# Patient Record
Sex: MALE | Race: WHITE | NOT HISPANIC OR LATINO | Employment: FULL TIME | URBAN - METROPOLITAN AREA
[De-identification: names, ages, dates, MRNs, and addresses within clinical notes are randomized per-mention and may not be internally consistent; named-entity substitution may affect disease eponyms.]

---

## 2017-03-02 ENCOUNTER — ALLSCRIPTS OFFICE VISIT (OUTPATIENT)
Dept: OTHER | Facility: OTHER | Age: 62
End: 2017-03-02

## 2017-03-02 ENCOUNTER — APPOINTMENT (OUTPATIENT)
Dept: LAB | Facility: CLINIC | Age: 62
End: 2017-03-02
Payer: COMMERCIAL

## 2017-03-02 DIAGNOSIS — I10 ESSENTIAL (PRIMARY) HYPERTENSION: ICD-10-CM

## 2017-03-02 DIAGNOSIS — Z13.6 ENCOUNTER FOR SCREENING FOR CARDIOVASCULAR DISORDERS: ICD-10-CM

## 2017-03-02 LAB
ALBUMIN SERPL BCP-MCNC: 4.1 G/DL (ref 3.5–5)
ALP SERPL-CCNC: 88 U/L (ref 46–116)
ALT SERPL W P-5'-P-CCNC: 27 U/L (ref 12–78)
ANION GAP SERPL CALCULATED.3IONS-SCNC: 6 MMOL/L (ref 4–13)
AST SERPL W P-5'-P-CCNC: 20 U/L (ref 5–45)
BASOPHILS # BLD AUTO: 0.05 THOUSANDS/ΜL (ref 0–0.1)
BASOPHILS NFR BLD AUTO: 1 % (ref 0–1)
BILIRUB SERPL-MCNC: 0.64 MG/DL (ref 0.2–1)
BUN SERPL-MCNC: 16 MG/DL (ref 5–25)
CALCIUM SERPL-MCNC: 9.2 MG/DL (ref 8.3–10.1)
CHLORIDE SERPL-SCNC: 103 MMOL/L (ref 100–108)
CHOLEST SERPL-MCNC: 200 MG/DL (ref 50–200)
CO2 SERPL-SCNC: 29 MMOL/L (ref 21–32)
CREAT SERPL-MCNC: 0.9 MG/DL (ref 0.6–1.3)
EOSINOPHIL # BLD AUTO: 0.24 THOUSAND/ΜL (ref 0–0.61)
EOSINOPHIL NFR BLD AUTO: 3 % (ref 0–6)
ERYTHROCYTE [DISTWIDTH] IN BLOOD BY AUTOMATED COUNT: 15 % (ref 11.6–15.1)
GFR SERPL CREATININE-BSD FRML MDRD: >60 ML/MIN/1.73SQ M
GLUCOSE SERPL-MCNC: 80 MG/DL (ref 65–140)
HCT VFR BLD AUTO: 49.1 % (ref 36.5–49.3)
HDLC SERPL-MCNC: 110 MG/DL (ref 40–60)
HGB BLD-MCNC: 17.3 G/DL (ref 12–17)
LDLC SERPL CALC-MCNC: 82 MG/DL (ref 0–100)
LYMPHOCYTES # BLD AUTO: 2.8 THOUSANDS/ΜL (ref 0.6–4.47)
LYMPHOCYTES NFR BLD AUTO: 29 % (ref 14–44)
MCH RBC QN AUTO: 32.3 PG (ref 26.8–34.3)
MCHC RBC AUTO-ENTMCNC: 35.2 G/DL (ref 31.4–37.4)
MCV RBC AUTO: 92 FL (ref 82–98)
MONOCYTES # BLD AUTO: 0.83 THOUSAND/ΜL (ref 0.17–1.22)
MONOCYTES NFR BLD AUTO: 9 % (ref 4–12)
NEUTROPHILS # BLD AUTO: 5.77 THOUSANDS/ΜL (ref 1.85–7.62)
NEUTS SEG NFR BLD AUTO: 58 % (ref 43–75)
NRBC BLD AUTO-RTO: 0 /100 WBCS
PLATELET # BLD AUTO: 284 THOUSANDS/UL (ref 149–390)
PMV BLD AUTO: 9.9 FL (ref 8.9–12.7)
POTASSIUM SERPL-SCNC: 4.1 MMOL/L (ref 3.5–5.3)
PROT SERPL-MCNC: 8.2 G/DL (ref 6.4–8.2)
RBC # BLD AUTO: 5.36 MILLION/UL (ref 3.88–5.62)
SODIUM SERPL-SCNC: 138 MMOL/L (ref 136–145)
TRIGL SERPL-MCNC: 40 MG/DL
TSH SERPL DL<=0.05 MIU/L-ACNC: 1.06 UIU/ML (ref 0.36–3.74)
WBC # BLD AUTO: 9.72 THOUSAND/UL (ref 4.31–10.16)

## 2017-03-02 PROCEDURE — 80053 COMPREHEN METABOLIC PANEL: CPT

## 2017-03-02 PROCEDURE — 80061 LIPID PANEL: CPT

## 2017-03-02 PROCEDURE — 84443 ASSAY THYROID STIM HORMONE: CPT

## 2017-03-02 PROCEDURE — 36415 COLL VENOUS BLD VENIPUNCTURE: CPT

## 2017-03-02 PROCEDURE — 85025 COMPLETE CBC W/AUTO DIFF WBC: CPT

## 2017-03-22 ENCOUNTER — TRANSCRIBE ORDERS (OUTPATIENT)
Dept: MRI IMAGING | Facility: CLINIC | Age: 62
End: 2017-03-22

## 2017-03-22 DIAGNOSIS — Z13.6 SCREENING FOR ISCHEMIC HEART DISEASE: Primary | ICD-10-CM

## 2017-03-29 ENCOUNTER — HOSPITAL ENCOUNTER (OUTPATIENT)
Dept: ULTRASOUND IMAGING | Facility: CLINIC | Age: 62
Discharge: HOME | End: 2017-03-29
Payer: COMMERCIAL

## 2017-03-29 DIAGNOSIS — Z13.6 SCREENING FOR ISCHEMIC HEART DISEASE: ICD-10-CM

## 2017-03-29 PROCEDURE — 76775 US EXAM ABDO BACK WALL LIM: CPT

## 2017-03-30 ENCOUNTER — GENERIC CONVERSION - ENCOUNTER (OUTPATIENT)
Dept: OTHER | Facility: OTHER | Age: 62
End: 2017-03-30

## 2017-06-19 ENCOUNTER — ALLSCRIPTS OFFICE VISIT (OUTPATIENT)
Dept: OTHER | Facility: OTHER | Age: 62
End: 2017-06-19

## 2017-06-19 DIAGNOSIS — Z12.5 ENCOUNTER FOR SCREENING FOR MALIGNANT NEOPLASM OF PROSTATE: ICD-10-CM

## 2017-08-14 ENCOUNTER — TRANSCRIBE ORDERS (OUTPATIENT)
Dept: ADMINISTRATIVE | Facility: HOSPITAL | Age: 62
End: 2017-08-14

## 2017-09-15 ENCOUNTER — GENERIC CONVERSION - ENCOUNTER (OUTPATIENT)
Dept: OTHER | Facility: OTHER | Age: 62
End: 2017-09-15

## 2017-09-15 ENCOUNTER — APPOINTMENT (OUTPATIENT)
Dept: LAB | Facility: CLINIC | Age: 62
End: 2017-09-15
Payer: COMMERCIAL

## 2017-09-15 DIAGNOSIS — I10 ESSENTIAL (PRIMARY) HYPERTENSION: ICD-10-CM

## 2017-09-15 LAB
ALBUMIN SERPL BCP-MCNC: 3.8 G/DL (ref 3.5–5)
ALP SERPL-CCNC: 77 U/L (ref 46–116)
ALT SERPL W P-5'-P-CCNC: 26 U/L (ref 12–78)
ANION GAP SERPL CALCULATED.3IONS-SCNC: 8 MMOL/L (ref 4–13)
AST SERPL W P-5'-P-CCNC: 19 U/L (ref 5–45)
BASOPHILS # BLD AUTO: 0.05 THOUSANDS/ΜL (ref 0–0.1)
BASOPHILS NFR BLD AUTO: 1 % (ref 0–1)
BILIRUB SERPL-MCNC: 0.7 MG/DL (ref 0.2–1)
BUN SERPL-MCNC: 11 MG/DL (ref 5–25)
CALCIUM SERPL-MCNC: 9.1 MG/DL (ref 8.3–10.1)
CHLORIDE SERPL-SCNC: 94 MMOL/L (ref 100–108)
CHOLEST SERPL-MCNC: 167 MG/DL (ref 50–200)
CO2 SERPL-SCNC: 29 MMOL/L (ref 21–32)
CREAT SERPL-MCNC: 0.86 MG/DL (ref 0.6–1.3)
EOSINOPHIL # BLD AUTO: 0.2 THOUSAND/ΜL (ref 0–0.61)
EOSINOPHIL NFR BLD AUTO: 2 % (ref 0–6)
ERYTHROCYTE [DISTWIDTH] IN BLOOD BY AUTOMATED COUNT: 14.4 % (ref 11.6–15.1)
GFR SERPL CREATININE-BSD FRML MDRD: 92 ML/MIN/1.73SQ M
GLUCOSE P FAST SERPL-MCNC: 78 MG/DL (ref 65–99)
HCT VFR BLD AUTO: 45.5 % (ref 36.5–49.3)
HDLC SERPL-MCNC: 93 MG/DL (ref 40–60)
HGB BLD-MCNC: 16.6 G/DL (ref 12–17)
LDLC SERPL CALC-MCNC: 65 MG/DL (ref 0–100)
LYMPHOCYTES # BLD AUTO: 2.57 THOUSANDS/ΜL (ref 0.6–4.47)
LYMPHOCYTES NFR BLD AUTO: 28 % (ref 14–44)
MCH RBC QN AUTO: 32.9 PG (ref 26.8–34.3)
MCHC RBC AUTO-ENTMCNC: 36.5 G/DL (ref 31.4–37.4)
MCV RBC AUTO: 90 FL (ref 82–98)
MONOCYTES # BLD AUTO: 0.76 THOUSAND/ΜL (ref 0.17–1.22)
MONOCYTES NFR BLD AUTO: 8 % (ref 4–12)
NEUTROPHILS # BLD AUTO: 5.47 THOUSANDS/ΜL (ref 1.85–7.62)
NEUTS SEG NFR BLD AUTO: 61 % (ref 43–75)
NRBC BLD AUTO-RTO: 0 /100 WBCS
PLATELET # BLD AUTO: 276 THOUSANDS/UL (ref 149–390)
PMV BLD AUTO: 9.2 FL (ref 8.9–12.7)
POTASSIUM SERPL-SCNC: 4 MMOL/L (ref 3.5–5.3)
PROT SERPL-MCNC: 7.5 G/DL (ref 6.4–8.2)
RBC # BLD AUTO: 5.04 MILLION/UL (ref 3.88–5.62)
SODIUM SERPL-SCNC: 131 MMOL/L (ref 136–145)
TRIGL SERPL-MCNC: 45 MG/DL
TSH SERPL DL<=0.05 MIU/L-ACNC: 0.94 UIU/ML (ref 0.36–3.74)
WBC # BLD AUTO: 9.08 THOUSAND/UL (ref 4.31–10.16)

## 2017-09-15 PROCEDURE — 85025 COMPLETE CBC W/AUTO DIFF WBC: CPT

## 2017-09-15 PROCEDURE — 80061 LIPID PANEL: CPT

## 2017-09-15 PROCEDURE — 84443 ASSAY THYROID STIM HORMONE: CPT

## 2017-09-15 PROCEDURE — 80053 COMPREHEN METABOLIC PANEL: CPT

## 2017-09-15 PROCEDURE — 36415 COLL VENOUS BLD VENIPUNCTURE: CPT

## 2017-09-26 ENCOUNTER — GENERIC CONVERSION - ENCOUNTER (OUTPATIENT)
Dept: OTHER | Facility: OTHER | Age: 62
End: 2017-09-26

## 2017-09-29 RX ORDER — TAMSULOSIN HYDROCHLORIDE 0.4 MG/1
0.4 CAPSULE ORAL
COMMUNITY
End: 2019-04-19 | Stop reason: SDUPTHER

## 2017-09-29 RX ORDER — FINASTERIDE 5 MG/1
5 TABLET, FILM COATED ORAL DAILY
COMMUNITY
End: 2019-04-19 | Stop reason: SDUPTHER

## 2017-09-29 RX ORDER — LISINOPRIL AND HYDROCHLOROTHIAZIDE 20; 12.5 MG/1; MG/1
1 TABLET ORAL DAILY
COMMUNITY
End: 2018-02-22 | Stop reason: SDUPTHER

## 2017-09-29 RX ORDER — AMLODIPINE BESYLATE 10 MG/1
10 TABLET ORAL DAILY
COMMUNITY
End: 2018-12-15 | Stop reason: SDUPTHER

## 2017-10-04 ENCOUNTER — ANESTHESIA EVENT (OUTPATIENT)
Dept: PERIOP | Facility: HOSPITAL | Age: 62
End: 2017-10-04
Payer: COMMERCIAL

## 2017-10-05 ENCOUNTER — GENERIC CONVERSION - ENCOUNTER (OUTPATIENT)
Dept: OTHER | Facility: OTHER | Age: 62
End: 2017-10-05

## 2017-10-05 ENCOUNTER — ANESTHESIA (OUTPATIENT)
Dept: PERIOP | Facility: HOSPITAL | Age: 62
End: 2017-10-05
Payer: COMMERCIAL

## 2017-10-05 ENCOUNTER — HOSPITAL ENCOUNTER (OUTPATIENT)
Facility: HOSPITAL | Age: 62
Setting detail: OUTPATIENT SURGERY
Discharge: HOME | End: 2017-10-05
Attending: INTERNAL MEDICINE | Admitting: INTERNAL MEDICINE
Payer: COMMERCIAL

## 2017-10-05 VITALS
HEART RATE: 59 BPM | BODY MASS INDEX: 20.67 KG/M2 | RESPIRATION RATE: 15 BRPM | SYSTOLIC BLOOD PRESSURE: 126 MMHG | DIASTOLIC BLOOD PRESSURE: 76 MMHG | HEIGHT: 75 IN | WEIGHT: 166.23 LBS | TEMPERATURE: 97.9 F | OXYGEN SATURATION: 97 %

## 2017-10-05 DIAGNOSIS — Z12.11 ENCOUNTER FOR SCREENING FOR MALIGNANT NEOPLASM OF COLON: ICD-10-CM

## 2017-10-05 DIAGNOSIS — Z86.010 HISTORY OF COLONIC POLYPS: ICD-10-CM

## 2017-10-05 PROCEDURE — 88305 TISSUE EXAM BY PATHOLOGIST: CPT | Performed by: INTERNAL MEDICINE

## 2017-10-05 RX ORDER — PROPOFOL 10 MG/ML
INJECTION, EMULSION INTRAVENOUS AS NEEDED
Status: DISCONTINUED | OUTPATIENT
Start: 2017-10-05 | End: 2017-10-05 | Stop reason: SURG

## 2017-10-05 RX ORDER — SODIUM CHLORIDE, SODIUM LACTATE, POTASSIUM CHLORIDE, CALCIUM CHLORIDE 600; 310; 30; 20 MG/100ML; MG/100ML; MG/100ML; MG/100ML
125 INJECTION, SOLUTION INTRAVENOUS CONTINUOUS
Status: DISCONTINUED | OUTPATIENT
Start: 2017-10-05 | End: 2017-10-05 | Stop reason: HOSPADM

## 2017-10-05 RX ORDER — LIDOCAINE HYDROCHLORIDE 10 MG/ML
INJECTION, SOLUTION INFILTRATION; PERINEURAL AS NEEDED
Status: DISCONTINUED | OUTPATIENT
Start: 2017-10-05 | End: 2017-10-05 | Stop reason: SURG

## 2017-10-05 RX ADMIN — PROPOFOL 50 MG: 10 INJECTION, EMULSION INTRAVENOUS at 12:03

## 2017-10-05 RX ADMIN — PROPOFOL 100 MG: 10 INJECTION, EMULSION INTRAVENOUS at 11:56

## 2017-10-05 RX ADMIN — PROPOFOL 50 MG: 10 INJECTION, EMULSION INTRAVENOUS at 12:00

## 2017-10-05 RX ADMIN — SODIUM CHLORIDE, POTASSIUM CHLORIDE, SODIUM LACTATE AND CALCIUM CHLORIDE 125 ML/HR: 600; 310; 30; 20 INJECTION, SOLUTION INTRAVENOUS at 11:23

## 2017-10-05 RX ADMIN — PROPOFOL 50 MG: 10 INJECTION, EMULSION INTRAVENOUS at 11:57

## 2017-10-05 RX ADMIN — PROPOFOL 50 MG: 10 INJECTION, EMULSION INTRAVENOUS at 12:06

## 2017-10-05 RX ADMIN — LIDOCAINE HYDROCHLORIDE 50 MG: 10 INJECTION, SOLUTION INFILTRATION; PERINEURAL at 11:56

## 2017-10-05 NOTE — DISCHARGE INSTRUCTIONS
Colonoscopy   WHAT YOU NEED TO KNOW:   A colonoscopy is a procedure to examine the inside of your colon (intestine) with a scope  Polyps or tissue growths may have been removed during your colonoscopy  It is normal to feel bloated and to have some abdominal discomfort  You should be passing gas  If you have hemorrhoids or you had polyps removed, you may have a small amount of bleeding  DISCHARGE INSTRUCTIONS:   Seek care immediately if:   · You have a large amount of bright red blood in your bowel movements  · Your abdomen is hard and firm and you have severe pain  · You have sudden trouble breathing  Contact your healthcare provider if:   · You develop a rash or hives  · You have a fever within 24 hours of your procedure  · You have not had a bowel movement for 3 days after your procedure  · You have questions or concerns about your condition or care  Activity:   · Do not lift, strain, or run  for 3 days after your procedure  · Rest after your procedure  You have been given medicine to relax you  Do not  drive or make important decisions until the day after your procedure  Return to your normal activity as directed  · Relieve gas and discomfort from bloating  by lying on your right side with a heating pad on your abdomen  You may need to take short walks to help the gas move out  Eat small meals until bloating is relieved  If you had polyps removed: For 7 days after your procedure:  · Do not  take aspirin  · Do not  go on long car rides  Help prevent constipation:   · Eat a variety of healthy foods  Healthy foods include fruit, vegetables, whole-grain breads, low-fat dairy products, beans, lean meat, and fish  Ask if you need to be on a special diet  Your healthcare provider may recommend that you eat high-fiber foods such as cooked beans  Fiber helps you have regular bowel movements  · Drink liquids as directed    Adults should drink between 9 and 13 eight-ounce cups of liquid every day  Ask what amount is best for you  For most people, good liquids to drink are water, juice, and milk  · Exercise as directed  Talk to your healthcare provider about the best exercise plan for you  Exercise can help prevent constipation, decrease your blood pressure and improve your health  Follow up with your healthcare provider as directed:  Write down your questions so you remember to ask them during your visits  © 2017 [employer] Information is for End User's use only and may not be sold, redistributed or otherwise used for commercial purposes  All illustrations and images included in CareNotes® are the copyrighted property of [employer]  or [employer]  The above information is an  only  It is not intended as medical advice for individual conditions or treatments  Talk to your doctor, nurse or pharmacist before following any medical regimen to see if it is safe and effective for you

## 2017-10-05 NOTE — ANESTHESIA POSTPROCEDURE EVALUATION
Post-Op Assessment Note      CV Status:  Stable    Mental Status:  Alert and awake    Hydration Status:  Stable    PONV Controlled:  None    Airway Patency:  Patent and adequate    Post Op Vitals Reviewed: Yes          Staff: CRNA       Comments: vss sv nonobstructed           /64 (10/05/17 1210)    Temp 97 9 °F (36 6 °C) (10/05/17 1210)    Pulse 67 (10/05/17 1210)   Resp 19 (10/05/17 1210)    SpO2 99 % (10/05/17 1210)

## 2017-10-05 NOTE — OP NOTE
**** GI/ENDOSCOPY REPORT ****     PATIENT NAME: Abdoul Medeiros ------ VISIT ID:  Patient ID: [id number]   YOB: 1955     INTRODUCTION: Colonoscopy - A 60 male patient presents for an outpatient   Colonoscopy at [address] [place] [address]  PREVIOUS COLONOSCOPY: 2002     INDICATIONS: Surveillance  Average-risk screening for colon cancer  CONSENT:  The benefits, risks, and alternatives to the procedure were   discussed and informed consent was obtained from the patient  PREPARATION: EKG, pulse, pulse oximetry and blood pressure were monitored   throughout the procedure  The patient was identified by myself both   verbally and by visual inspection of ID band  Airway Assessment   Classification: Airway class 2 - Visualization of the soft palate, fauces   and uvula  ASA Classification: Class 2 - Patient has mild to moderate   systemic disturbance that may or may not be related to the disorder   requiring surgery  The patient was kept NPO for eight hours prior to the   procedure  The patient received Nulytely in preparation for the procedure  MEDICATIONS: Anesthesia-check records MAC anesthesia  PROCEDURE:  The endoscope was passed without difficulty through the anus   under direct visualization and advanced to the cecum, confirmed by   appendiceal orifice and ileocecal valve  The scope was withdrawn and the   mucosa was carefully examined  The quality of the preparation was good  Cecal Intubation Time: 2 minutes(s) Scope Withdrawal Time: 8 minutes(s)     RECTAL EXAM: Normal rectal exam      FINDINGS:  A single sessile polyp, measuring between 10 and 20 mm in size,   was found in the sigmoid colon  The polyp was completely removed by snare   cautery polypectomy  Retrieved  A single sessile polyp, measuring between   10 and 20 mm in size, was found in the hepatic flexure  The polyp was   completely removed by cold snare polypectomy  The polyp was retrieved   The   cecum, rectum, sigmoid colon, descending colon, and splenic flexure   appeared to be normal      COMPLICATIONS: There were no complications  IMPRESSIONS: A single sessile polyp found in the sigmoid colon; removed by   snare cautery polypectomy  A single sessile polyp found in the hepatic   flexure; removed by cold snare polypectomy  Normal cecum, rectum, sigmoid   colon, descending colon, and splenic flexure  RECOMMENDATIONS: Colonoscopy recommended in 3 years  Follow-up on the   results of the biopsy specimens  ESTIMATED BLOOD LOSS: None  PATHOLOGY SPECIMENS: Completely removed by snare cautery polypectomy  Completely removed by cold snare polypectomy  Yes     PROCEDURE CODES: : Colonoscopy with snare polypectomy     ICD-9 Codes: V76 51 Special screening for malignant neoplasms of colon   211 3 Benign neoplasm of colon 211 3 Benign neoplasm of colon     ICD-10 Codes: Z12 11 Encounter for screening for malignant neoplasm of   colon K63 5 Polyp of colon K63 5 Polyp of colon     PERFORMED BY: SHAI Baldwin  on 10/05/2017  Version 1, electronically signed by SHAI Ha  on   10/05/2017 at 12:10

## 2017-10-05 NOTE — ANESTHESIA PREPROCEDURE EVALUATION
Review of Systems/Medical History  Patient summary reviewed        Cardiovascular  Negative cardio ROS Hypertension ,    Pulmonary  Negative pulmonary ROS Smoker cigarette smoker , Tobacco cessation counseling given, ,        GI/Hepatic  Negative GI/hepatic ROS          Negative  ROS Prostatic disorder, benign prostatic hyperplasia       Endo/Other  Negative endo/other ROS      GYN  Negative gynecology ROS          Hematology  Negative hematology ROS      Musculoskeletal  Negative musculoskeletal ROS        Neurology  Negative neurology ROS      Psychology   Negative psychology ROS            Physical Exam    Airway    Mallampati score: II  TM Distance: >3 FB  Neck ROM: full     Dental       Cardiovascular  Comment: Negative ROS, Cardiovascular exam normal    Pulmonary  Pulmonary exam normal     Other Findings        Anesthesia Plan  ASA Score- 2       Anesthesia Type- IV sedation with anesthesia        Induction- intravenous      Informed Consent  Anesthetic plan and risks discussed with patient

## 2017-10-10 ENCOUNTER — GENERIC CONVERSION - ENCOUNTER (OUTPATIENT)
Dept: OTHER | Facility: OTHER | Age: 62
End: 2017-10-10

## 2018-01-13 VITALS
HEART RATE: 72 BPM | WEIGHT: 174 LBS | BODY MASS INDEX: 21.64 KG/M2 | HEIGHT: 75 IN | SYSTOLIC BLOOD PRESSURE: 150 MMHG | DIASTOLIC BLOOD PRESSURE: 80 MMHG

## 2018-01-13 VITALS
HEIGHT: 75 IN | DIASTOLIC BLOOD PRESSURE: 100 MMHG | WEIGHT: 176 LBS | BODY MASS INDEX: 21.88 KG/M2 | HEART RATE: 64 BPM | SYSTOLIC BLOOD PRESSURE: 160 MMHG

## 2018-01-15 NOTE — RESULT NOTES
Verified Results  (1) TISSUE EXAM [id number] 12:08PM Hola Ice     Test Name Result Flag Reference   LAB AP CASE REPORT (Report)     Surgical Pathology Report             Case: [id number]                   Authorizing Provider: Myrtle Weller MD  Collected:      10/05/2017 1208        Ordering Location:   [facility] Received:      10/05/2017 1342                    Operating Room                                 Pathologist:      Jonelle Arceo MD                             Specimens:  A) - Polyp, Colorectal, hepatic flexure polyp                             B) - Polyp, Colorectal, 35cm polyp   LAB AP FINAL DIAGNOSIS (Report)     A  Colon, hepatic flexure polyp:    - Benign colonic mucosa, negative for dysplasia and malignancy  B  Colon, polyp at 35 cm:    - Tubular adenoma  - Negative for high grade dysplasia and malignancy  Electronically signed by Jonelle Arceo MD on 10/9/2017 at 1:46 PM   LAB AP SURGICAL ADDITIONAL INFORMATION (Report)     All controls performed with the immunohistochemical stains reported above   reacted appropriately  These tests were developed and their performance   characteristics determined by [facility] or   "[employer]  They may not be cleared or approved by the [place]  Food and Drug Administration  The FDA has determined that such clearance   or approval is not necessary  These tests are used for clinical purposes  They should not be regarded as investigational or for research  This   laboratory has been approved by [place] 88, designated as a high-complexity   laboratory and is qualified to perform these tests  LAB AP GROSS DESCRIPTION (Report)     A  The specimen is received in formalin, labeled with the patient's name   and hospital number, and is designated hepatic flexure polyp, is a   single irregularly shaped fragment of tan soft tissue measuring 1 0 cm in   greatest dimension  Entirely submitted   One cassette  B  The specimen is received in formalin, labeled with the patient's name   and hospital number, and is designated 35 cm polyp, is a tan-brown   polypoid structure measuring 1 0 cm in greatest dimension  The resection   margin is inked black  The specimen is bisected revealing tan cut   surfaces  Entirely submitted  One cassette  Note: The estimated total formalin fixation time based upon information   provided by the submitting clinician and the standard processing schedule   is less than 72 hours      RLR   LAB AP CLINICAL INFORMATION Cold snare polyp     Cold snare polyp

## 2018-01-15 NOTE — RESULT NOTES
Verified Results  [address] [id number] 07:48AM Shelby Quiroz     Test Name Result Flag Reference   US ABDOMINAL AORTA (Report)     ABDOMINAL AORTIC ULTRASOUND     INDICATION: Evaluate for aortic aneurysm  COMPARISON: None  FINDINGS:      Ultrasound of the abdominal aorta was performed in longitudinal and transverse planes with a curvilinear transducer  Proximal aorta: 2 1 x 2 1 cm   Mid aorta:  2 1 x 2 0 cm   Distal aorta:  1 7 x 2 0 cm   Right common iliac origin: 1 2 cm   Left common iliac origin: 1 4 cm     No periaortic collections or adenopathy detected  IMPRESSION:     No evidence for abdominal aortic aneurysm         Workstation performed: [id number]     Signed by:   Delia Ribeiro MD   3/30/17

## 2018-01-22 VITALS
HEART RATE: 76 BPM | HEIGHT: 75 IN | SYSTOLIC BLOOD PRESSURE: 128 MMHG | WEIGHT: 173 LBS | BODY MASS INDEX: 21.51 KG/M2 | DIASTOLIC BLOOD PRESSURE: 80 MMHG

## 2018-01-22 VITALS
WEIGHT: 171.25 LBS | DIASTOLIC BLOOD PRESSURE: 84 MMHG | SYSTOLIC BLOOD PRESSURE: 138 MMHG | HEART RATE: 84 BPM | OXYGEN SATURATION: 92 % | BODY MASS INDEX: 21.29 KG/M2 | HEIGHT: 75 IN

## 2018-02-22 DIAGNOSIS — I10 ESSENTIAL HYPERTENSION: Primary | ICD-10-CM

## 2018-02-22 RX ORDER — LISINOPRIL AND HYDROCHLOROTHIAZIDE 20; 12.5 MG/1; MG/1
TABLET ORAL
Qty: 180 TABLET | Refills: 3 | Status: SHIPPED | OUTPATIENT
Start: 2018-02-22 | End: 2019-03-11 | Stop reason: SDUPTHER

## 2018-02-27 ENCOUNTER — TELEPHONE (OUTPATIENT)
Dept: INTERNAL MEDICINE CLINIC | Facility: CLINIC | Age: 63
End: 2018-02-27

## 2018-02-27 NOTE — TELEPHONE ENCOUNTER
Pt cld stating that he needs to have his LISINOPRIL filled  He is totally out  Please send to [facility] in [place]

## 2018-08-10 ENCOUNTER — OFFICE VISIT (OUTPATIENT)
Dept: INTERNAL MEDICINE CLINIC | Facility: CLINIC | Age: 63
End: 2018-08-10
Payer: COMMERCIAL

## 2018-08-10 VITALS
BODY MASS INDEX: 20.91 KG/M2 | HEART RATE: 63 BPM | SYSTOLIC BLOOD PRESSURE: 128 MMHG | OXYGEN SATURATION: 94 % | DIASTOLIC BLOOD PRESSURE: 100 MMHG | HEIGHT: 75 IN | WEIGHT: 168.2 LBS

## 2018-08-10 DIAGNOSIS — M54.12 CERVICAL RADICULOPATHY: Primary | ICD-10-CM

## 2018-08-10 PROBLEM — N40.0 ENLARGED PROSTATE WITHOUT LOWER URINARY TRACT SYMPTOMS (LUTS): Status: ACTIVE | Noted: 2017-03-02

## 2018-08-10 PROBLEM — F17.210 CIGARETTE NICOTINE DEPENDENCE WITHOUT COMPLICATION: Status: ACTIVE | Noted: 2017-03-02

## 2018-08-10 PROBLEM — R35.1 NOCTURIA: Status: ACTIVE | Noted: 2017-06-19

## 2018-08-10 PROBLEM — I95.1 ORTHOSTATIC HYPOTENSION: Status: ACTIVE | Noted: 2017-09-15

## 2018-08-10 PROBLEM — R35.0 INCREASED FREQUENCY OF URINATION: Status: ACTIVE | Noted: 2017-06-19

## 2018-08-10 PROBLEM — J30.9 ALLERGIC RHINITIS: Status: ACTIVE | Noted: 2017-03-02

## 2018-08-10 PROCEDURE — 3008F BODY MASS INDEX DOCD: CPT | Performed by: INTERNAL MEDICINE

## 2018-08-10 PROCEDURE — 99214 OFFICE O/P EST MOD 30 MIN: CPT | Performed by: INTERNAL MEDICINE

## 2018-08-10 RX ORDER — METHOCARBAMOL 500 MG/1
500 TABLET, FILM COATED ORAL 2 TIMES DAILY
Qty: 20 TABLET | Refills: 0 | Status: SHIPPED | OUTPATIENT
Start: 2018-08-10 | End: 2020-03-13

## 2018-08-10 RX ORDER — NAPROXEN 500 MG/1
500 TABLET ORAL 2 TIMES DAILY WITH MEALS
Qty: 20 TABLET | Refills: 0 | Status: SHIPPED | OUTPATIENT
Start: 2018-08-10 | End: 2020-03-13

## 2018-08-10 NOTE — PROGRESS NOTES
INTERNAL MEDICINE FOLLOW-UP OFFICE VISIT  [facility]    NAME: Fabricio Moreno  AGE: 58 y o  SEX: male  : 1955   MRN: [id number]    DATE: 8/10/2018  TIME: 3:13 PM    Assessment and Plan     Diagnoses and all orders for this visit:    Cervical radiculopathy  -     naproxen (NAPROSYN) 500 mg tablet; Take 1 tablet (500 mg total) by mouth 2 (two) times a day with meals  -     methocarbamol (ROBAXIN) 500 mg tablet; Take 1 tablet (500 mg total) by mouth 2 (two) times a day    He was in the ER yesterday with chest pain and had a cardiac workup which was negative  He was told he had cervical radiculopathy  The    - Counseling Documentation: patient was counseled regarding: diagnostic results, instructions for management, risk factor reductions, prognosis, patient and family education, impressions, risks and benefits of treatment options and importance of compliance with treatment  - Medication Side Effects: Adverse side effects of medications were reviewed with the patient/guardian today  Return to office in: as needed the    Chief Complaint     Chief Complaint   Patient presents with    Follow-up     ER Visit - [facility]       History of Present Illness     Neck Pain    This is a new problem  The current episode started in the past 7 days  The problem occurs intermittently  The problem has been gradually improving  The pain is associated with nothing  The quality of the pain is described as aching  The pain is at a severity of 4/10  The pain is mild  Associated symptoms include numbness and tingling  Pertinent negatives include no chest pain, fever, headaches or weakness  He has tried NSAIDs for the symptoms  The treatment provided mild relief         The following portions of the patient's history were reviewed and updated as appropriate: allergies, current medications, past family history, past medical history, past social history, past surgical history and problem list     Review of Systems     Review of Systems   Constitutional: Negative for chills, diaphoresis, fatigue and fever  HENT: Negative for congestion, ear discharge, ear pain, hearing loss, postnasal drip, rhinorrhea, sinus pain, sinus pressure, sneezing, sore throat and voice change  Eyes: Negative for pain, discharge, redness and visual disturbance  Respiratory: Negative for cough, chest tightness, shortness of breath and wheezing  Cardiovascular: Negative for chest pain, palpitations and leg swelling  Gastrointestinal: Negative for abdominal distention, abdominal pain, blood in stool, constipation, diarrhea, nausea and vomiting  Endocrine: Negative for cold intolerance, heat intolerance, polydipsia, polyphagia and polyuria  Genitourinary: Negative for dysuria, flank pain, frequency, hematuria and urgency  Musculoskeletal: Positive for neck pain  Negative for arthralgias, back pain, gait problem, joint swelling, myalgias and neck stiffness  Skin: Negative for rash  Neurological: Positive for tingling and numbness  Negative for dizziness, tremors, syncope, facial asymmetry, speech difficulty, weakness, light-headedness and headaches  Hematological: Does not bruise/bleed easily  Psychiatric/Behavioral: Negative for behavioral problems, confusion and sleep disturbance  The patient is not nervous/anxious  Active Problem List     Patient Active Problem List   Diagnosis    Allergic rhinitis    Cigarette nicotine dependence without complication    Enlarged prostate without lower urinary tract symptoms (luts)    Essential hypertension    Nocturia    Orthostatic hypotension    Increased frequency of urination    Cervical radiculopathy       Objective     /100 (BP Location: Left arm, Patient Position: Sitting)   Pulse 63   Ht 6' 3" (1 905 m)   Wt 76 3 kg (168 lb 3 2 oz)   SpO2 94%   BMI 21 02 kg/m²     Physical Exam   Constitutional: He is oriented to person, place, and time   He appears well-developed and well-nourished  No distress  HENT:   Head: Normocephalic and atraumatic  Right Ear: External ear normal    Left Ear: External ear normal    Nose: Nose normal    Mouth/Throat: Oropharynx is clear and moist    Eyes: Conjunctivae and EOM are normal  Right eye exhibits no discharge  Left eye exhibits no discharge  No scleral icterus  Neck: Normal range of motion  Neck supple  No JVD present  No tracheal deviation present  No thyromegaly present  Cardiovascular: Normal rate, regular rhythm, normal heart sounds and intact distal pulses  Exam reveals no gallop and no friction rub  No murmur heard  Pulmonary/Chest: Effort normal and breath sounds normal  No respiratory distress  He has no wheezes  He has no rales  He exhibits no tenderness  Abdominal: Soft  Bowel sounds are normal  He exhibits no distension  There is no tenderness  There is no rebound and no guarding  Musculoskeletal: Normal range of motion  He exhibits no edema or tenderness  Lymphadenopathy:     He has no cervical adenopathy  Neurological: He is alert and oriented to person, place, and time  No cranial nerve deficit  He exhibits normal muscle tone  Coordination normal    Skin: Skin is warm and dry  No rash noted  He is not diaphoretic  No erythema  Psychiatric: He has a normal mood and affect   Judgment normal          Current Medications       Current Outpatient Prescriptions:     amLODIPine (NORVASC) 10 mg tablet, Take 10 mg by mouth daily, Disp: , Rfl:     finasteride (PROSCAR) 5 mg tablet, Take 5 mg by mouth daily, Disp: , Rfl:     lisinopril-hydrochlorothiazide (PRINZIDE,ZESTORETIC) 20-12 5 MG per tablet, TAKE 2 TABLET DAILY, Disp: 180 tablet, Rfl: 3    tamsulosin (FLOMAX) 0 4 mg, Take 0 4 mg by mouth daily with dinner, Disp: , Rfl:     methocarbamol (ROBAXIN) 500 mg tablet, Take 1 tablet (500 mg total) by mouth 2 (two) times a day, Disp: 20 tablet, Rfl: 0    naproxen (NAPROSYN) 500 mg tablet, Take 1 tablet (500 mg total) by mouth 2 (two) times a day with meals, Disp: 20 tablet, Rfl: 0    Health Maintenance     Health Maintenance   Topic Date Due    HIV SCREENING  1955    Hepatitis C Screening  1955    DTaP,Tdap,and Td Vaccines (1 - Tdap) 03/14/1975    INFLUENZA VACCINE  09/01/2018    Depression Screening PHQ-9  08/10/2019    CRC Screening: Colonoscopy  10/05/2020     Immunization History   Administered Date(s) Administered    Influenza TIV (IM) 1955    Pneumococcal Polysaccharide PPV23 1955    Tdap 1955    Zoster 1955         Eli Mcgill MD  [facility]

## 2018-08-13 ENCOUNTER — NURSE TRIAGE (OUTPATIENT)
Dept: PHYSICAL THERAPY | Facility: OTHER | Age: 63
End: 2018-08-13

## 2018-08-13 ENCOUNTER — TELEPHONE (OUTPATIENT)
Dept: INTERNAL MEDICINE CLINIC | Facility: CLINIC | Age: 63
End: 2018-08-13

## 2018-08-13 DIAGNOSIS — M54.12 CERVICAL RADICULOPATHY: Primary | ICD-10-CM

## 2018-08-13 DIAGNOSIS — M25.512 ACUTE PAIN OF LEFT SHOULDER: ICD-10-CM

## 2018-08-13 DIAGNOSIS — M79.602 PAIN OF LEFT UPPER EXTREMITY: Primary | ICD-10-CM

## 2018-08-13 NOTE — TELEPHONE ENCOUNTER
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  Was not lifting or tossing anything at the time of onset of pain  Pt unable to sleep because of the arm pain  Can sit in the recliner without pain  But when he lies flat, the pain is terrible       Background - Initial Assessment  Clinical complaint: left arm pain "tricept to elbow", some pain in left shoulder  Date of onset: 8/9/18  Mechanism of injury: unk    Previous Treatment - Previous Treatment  Previous evaluation: ED visit 8/9, PCP 8/10  Current provider: *No Answer*  Diagnostics: XRAYs  Previous treatment: *No Answer*    Protocols used: [facility]

## 2018-08-13 NOTE — TELEPHONE ENCOUNTER
Pt was here for an appt on Friday with Dr Stew Dooley for a pinched nerve in his left arm  He has been taking naproxen and methocarbamol but is still in a lot of pain  States Dr Stew Dooley mentioned either physical therapy or an MRI  He would like to have both done if possible   Please advise    [phone]

## 2018-08-13 NOTE — TELEPHONE ENCOUNTER
Answering service 8/12/2018 1:50 pm   Pt is still in pain needs appointment for tomorrow    Next step pt contact with details

## 2018-08-14 ENCOUNTER — EVALUATION (OUTPATIENT)
Dept: PHYSICAL THERAPY | Facility: CLINIC | Age: 63
End: 2018-08-14
Payer: COMMERCIAL

## 2018-08-14 VITALS — HEART RATE: 70 BPM | TEMPERATURE: 100 F | DIASTOLIC BLOOD PRESSURE: 90 MMHG | SYSTOLIC BLOOD PRESSURE: 150 MMHG

## 2018-08-14 DIAGNOSIS — M25.512 ACUTE PAIN OF LEFT SHOULDER: ICD-10-CM

## 2018-08-14 DIAGNOSIS — M79.602 PAIN OF LEFT UPPER EXTREMITY: ICD-10-CM

## 2018-08-14 DIAGNOSIS — M54.12 CERVICAL RADICULOPATHY: Primary | ICD-10-CM

## 2018-08-14 PROCEDURE — G8990 OTHER PT/OT CURRENT STATUS: HCPCS | Performed by: PHYSICAL THERAPIST

## 2018-08-14 PROCEDURE — 97140 MANUAL THERAPY 1/> REGIONS: CPT | Performed by: PHYSICAL THERAPIST

## 2018-08-14 PROCEDURE — G8991 OTHER PT/OT GOAL STATUS: HCPCS | Performed by: PHYSICAL THERAPIST

## 2018-08-14 PROCEDURE — 97012 MECHANICAL TRACTION THERAPY: CPT | Performed by: PHYSICAL THERAPIST

## 2018-08-14 NOTE — PROGRESS NOTES
PT Evaluation     Today's date: 2018  Patient name: Barbara Poster  : 1955  MRN: [id number]  Referring provider: Catherine Bergman MD  Dx:   Encounter Diagnosis     ICD-10-CM    1  Cervical radiculopathy M54 12    2  Pain of left upper extremity M79 602 Ambulatory referral to PT spine   3  Acute pain of left shoulder M25 512 Ambulatory referral to PT spine       Start Time: 1600  Stop Time: 1700  Total time in clinic (min): 60 minutes    Assessment  Impairments: abnormal muscle firing, abnormal or restricted ROM, activity intolerance, impaired physical strength, lacks appropriate home exercise program, pain with function, poor posture  and poor body mechanics    Assessment details: Patient is a 58 y o  male who presents to [place] [address] program with the above listed impairments  Patients fits into a hypomobility and traction treatment category due to probable facet dysfunction and would benefit from skilled PT services to address these impairments and to maximize function  Understanding of Dx/Px/POC: good   Prognosis: good    Plan  Patient would benefit from: skilled physical therapy  Planned modality interventions: traction  Planned therapy interventions: home exercise program, functional ROM exercises, flexibility, body mechanics training, postural training, patient education, therapeutic activities, therapeutic exercise, manual therapy, joint mobilization and neuromuscular re-education  Frequency: 2x week  Duration in weeks: 2  Treatment plan discussed with: patient and PCP  Plan details: PRN f/u at this time  Subjective Evaluation    History of Present Illness  Mechanism of injury: Patient reports on 18 he started to have L shoulder and arm pain  He states he thought he was having a MI so he drove to the ER at [facility]  He did have an x-ray of the c/s taken there  There was not acute findings according to the patient    He states she saw her PCP on 8/10/18 who prescribed naproxen and muscle relaxer  He states this has been helping  He notes before taking this medication he had a hard time turning his head to the L  He notes currently his pain radiates into the triceps and forearm on the L with numbness into the 4th and 5th digits  He notes that he has not been able to sleep the last 4 days  He notes that lifting his arm up or sitting back will help his sxs  Occupation: Supervisor for [employer]   - 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  PLOF: Independent   Pain  Current pain ratin  At best pain ratin  At worst pain ratin  Location: L arm    Patient Goals  Patient goal: To stop the pain  Objective     Special Questions  Negative for bladder dysfunction, bowel dysfunction and saddle (S4) numbness    Additional Special Questions  Unexplained Weight Loss: NO  Fever: NO  Urine Retention: NO  Acute Drop Foot or Paralysis: NO  Major Trauma (fall from height, high speed collision, direct blow to spine): NO              If yes; does patient have nausea, lightheadedness, or loss of consciousness: NA        Tenderness     Additional Tenderness Details  No tenderness noted upon palpation  Neurological Testing     Sensation   Cervical/Thoracic   Left   Intact: light touch    Right   Intact: light touch    Lumbar   Left   Intact: light touch    Right   Intact: light touch    Reflexes   Left   Deltoid (C5): normal (2+)  Biceps (C5/C6): normal (2+)  Triceps (C7): normal (2+)  Patellar (L4): normal (2+)  Achilles (S1): normal (2+)    Right   Deltoid (C5): normal (2+)  Biceps (C5/C6): normal (2+)  Triceps (C7): normal (2+)  Patellar (L4): normal (2+)  Achilles (S1): normal (2+)    Additional Neurological Details  UQS was positive  Spurlings test reproduced radicular sxs down the left  upper extremity and created pheralization  A cervical traction force centralized sxs        Active Range of Motion   Cervical/Thoracic Spine   Cervical    Flexion: 30 degrees   Extension: 60 degrees with pain  Left lateral flexion: 25 degrees with pain  Right lateral flexion: 30 degrees   Left rotation: 55 degrees with pain  Right rotation: 70 degrees     Additional Active Range of Motion Details  -  -    Joint Play Hypomobile: C5, C6, C7 and T1   Comments: -    Strength/Myotome Testing     Additional Strength Details  -    Tests   Cervical   Deep neck flexor endurance: 10 seconds  Positive repeated extension  Negative repeated flexion  Left   Positive cervical distraction and cervical compression test     Negative alar ligament integrity  Right   Negative alar ligament integrity  Left Shoulder   Negative ULTT1, ULTT3 and ULTT4  Right Shoulder   Negative ULTT1, ULTT3 and ULTT4  Additional Tests Details  AA instability - Sharp Lv: negative  General Comments     Cervical/Thoracic Comments  No myotomal weakness detected  4/5 strength noted grossly at B scapular stabilizers  Pt instructed to continue to monitor temperature and f/u with PCP if high temp is noted  CTJ, c/s lateral glides, and c/s mechanical traction performed  L c/s rotation increased to 65 degrees and sxs were abolished after today's treatment  Pt educated on posture in regarding to sitting, standing, and with sleeping  Pt is to call back on Friday to f/u with me  He is welcome to return sooner if needed        Flowsheet Rows      Most Recent Value   PT/OT G-Codes   Current Score  69   Projected Score  83   FOTO information reviewed  Yes   Assessment Type  Evaluation   G code set  Other PT/OT Primary   Other PT Primary Current Status ()  CJ   Other PT Primary Goal Status ()  CH          Precautions: HTN    Daily Treatment Diary   Diagnosis: c/s radic   Precautions: NA   Manuals 8/14       C/s lateral glides CMF       CTJ grade V CMF                       Exercise Diary        Mechanical tx 20#, 5'x2                                            Pt edu cmf           Modalities             CP PRN

## 2018-08-15 ENCOUNTER — OFFICE VISIT (OUTPATIENT)
Dept: PHYSICAL THERAPY | Facility: CLINIC | Age: 63
End: 2018-08-15
Payer: COMMERCIAL

## 2018-08-15 DIAGNOSIS — M54.12 CERVICAL RADICULOPATHY: Primary | ICD-10-CM

## 2018-08-15 DIAGNOSIS — M79.602 PAIN OF LEFT UPPER EXTREMITY: ICD-10-CM

## 2018-08-15 DIAGNOSIS — M25.512 ACUTE PAIN OF LEFT SHOULDER: ICD-10-CM

## 2018-08-15 PROCEDURE — 97110 THERAPEUTIC EXERCISES: CPT | Performed by: PHYSICAL THERAPIST

## 2018-08-15 PROCEDURE — 97012 MECHANICAL TRACTION THERAPY: CPT | Performed by: PHYSICAL THERAPIST

## 2018-08-15 PROCEDURE — 97140 MANUAL THERAPY 1/> REGIONS: CPT | Performed by: PHYSICAL THERAPIST

## 2018-08-15 NOTE — PROGRESS NOTES
Daily Note     Today's date: 2018  Patient name: Abhay Casper  : 1955  MRN: [id number]  Referring provider: Virgil Aleman MD  Dx:   Encounter Diagnosis     ICD-10-CM    1  Cervical radiculopathy M54 12    2  Pain of left upper extremity M79 602    3  Acute pain of left shoulder M25 512        Start Time: 1400  Stop Time: 1450  Total time in clinic (min): 50 minutes    Subjective: Pt called back this morning and stats that his pain returned  He reports his pain is less intense  He notes he was able to sleep easier last night  He no longer notes pain in the triceps but does note pain at the L foremen and continued tingling at the 4th and 5th digits  Objective: See treatment diary below    Diagnosis: c/s radic   Precautions: NA   Manuals 8/14 8/15      C/s lateral glides CMF CMF      CTJ grade V CMF CMF                      Exercise Diary        Mechanical tx 20#, 5'x2 25#  5'x3                              DNF  :05x15      Ulnar nerve glies  :05x10      B ER against wall  Yellow x15                                                                                                         Pt edu cmf  CMF         Modalities             CP PRN                                          Assessment: I was unable to decrease sxs today with manual technique of the c/s  Minimal decrease of sxs noted with mechanical tx  Median and radial nerve TT was negative  Ulnar was positive  Pt given ulanr nerve glides which did eliminate pt's sxs  Pt was given updated HEP with good understanding  Plan: Pt to f/u as needed  We will touch base tomorrow via phone call  If sxs do not progress we will consider ordering imaging

## 2018-09-26 NOTE — PROGRESS NOTES
PT Discharge    Today's date: 2018  Patient name: Isha Menjivar  : 1955  MRN: [id number]  Referring provider: Kandis Coy MD  Dx:   Encounter Diagnosis     ICD-10-CM    1  Cervical radiculopathy M54 12    2  Pain of left upper extremity M79 602    3  Acute pain of left shoulder M25 512        Start Time: 1400  Stop Time: 1450  Total time in clinic (min): 50 minutes    Assessment/Plan    Subjective    Objective    Flowsheet Rows      Most Recent Value   PT/OT G-Codes   Current Score  54   Projected Score  73          Pt never returned to PT but did leave a message a few days after his last appt  That he was feeling much better and no long had pain  Multi attempts were made to reach to patient but I was unable to reach him via phone call  D/C from PT at this time

## 2018-12-15 DIAGNOSIS — I10 ESSENTIAL HYPERTENSION: Primary | ICD-10-CM

## 2018-12-17 RX ORDER — AMLODIPINE BESYLATE 10 MG/1
TABLET ORAL
Qty: 90 TABLET | Refills: 3 | Status: SHIPPED | OUTPATIENT
Start: 2018-12-17 | End: 2019-12-14 | Stop reason: SDUPTHER

## 2019-03-11 DIAGNOSIS — I10 ESSENTIAL HYPERTENSION: ICD-10-CM

## 2019-03-11 RX ORDER — LISINOPRIL AND HYDROCHLOROTHIAZIDE 20; 12.5 MG/1; MG/1
2 TABLET ORAL DAILY
Qty: 180 TABLET | Refills: 3 | Status: SHIPPED | OUTPATIENT
Start: 2019-03-11 | End: 2020-03-09 | Stop reason: SDUPTHER

## 2019-03-11 NOTE — TELEPHONE ENCOUNTER
Pt called requesting a refill on his lisinopril-hydrochlorothiazide(prinzide,zestoretic) 20-12 5 mg per tablet   Sent into [facility] pharmacy      Patient stated that its hard to come in and be seen to get it filled because he has no personal days until April     Any questions please contact Pepe Phipps at  [phone]

## 2019-04-19 ENCOUNTER — OFFICE VISIT (OUTPATIENT)
Dept: UROLOGY | Facility: CLINIC | Age: 64
End: 2019-04-19
Payer: COMMERCIAL

## 2019-04-19 VITALS
HEART RATE: 60 BPM | SYSTOLIC BLOOD PRESSURE: 140 MMHG | DIASTOLIC BLOOD PRESSURE: 88 MMHG | BODY MASS INDEX: 21.06 KG/M2 | HEIGHT: 75 IN | WEIGHT: 169.4 LBS

## 2019-04-19 DIAGNOSIS — R35.0 BENIGN PROSTATIC HYPERPLASIA WITH URINARY FREQUENCY: Primary | ICD-10-CM

## 2019-04-19 DIAGNOSIS — N40.1 BENIGN PROSTATIC HYPERPLASIA WITH URINARY FREQUENCY: Primary | ICD-10-CM

## 2019-04-19 LAB — POST-VOID RESIDUAL VOLUME, ML POC: 13 ML

## 2019-04-19 PROCEDURE — 99214 OFFICE O/P EST MOD 30 MIN: CPT | Performed by: PHYSICIAN ASSISTANT

## 2019-04-19 PROCEDURE — 51741 ELECTRO-UROFLOWMETRY FIRST: CPT | Performed by: PHYSICIAN ASSISTANT

## 2019-04-19 PROCEDURE — 51798 US URINE CAPACITY MEASURE: CPT | Performed by: PHYSICIAN ASSISTANT

## 2019-04-19 RX ORDER — SILDENAFIL CITRATE 20 MG/1
20 TABLET ORAL 3 TIMES DAILY
Qty: 30 TABLET | Refills: 11 | Status: SHIPPED | OUTPATIENT
Start: 2019-04-19 | End: 2021-02-18 | Stop reason: SDUPTHER

## 2019-04-19 RX ORDER — TAMSULOSIN HYDROCHLORIDE 0.4 MG/1
0.4 CAPSULE ORAL
Qty: 90 CAPSULE | Refills: 3 | Status: SHIPPED | OUTPATIENT
Start: 2019-04-19 | End: 2019-08-01 | Stop reason: SDUPTHER

## 2019-04-19 RX ORDER — FINASTERIDE 5 MG/1
5 TABLET, FILM COATED ORAL DAILY
Qty: 90 TABLET | Refills: 3 | Status: SHIPPED | OUTPATIENT
Start: 2019-04-19 | End: 2019-08-01 | Stop reason: SDUPTHER

## 2019-08-01 ENCOUNTER — OFFICE VISIT (OUTPATIENT)
Dept: UROLOGY | Facility: CLINIC | Age: 64
End: 2019-08-01
Payer: COMMERCIAL

## 2019-08-01 VITALS
HEIGHT: 75 IN | WEIGHT: 170 LBS | DIASTOLIC BLOOD PRESSURE: 70 MMHG | SYSTOLIC BLOOD PRESSURE: 138 MMHG | HEART RATE: 62 BPM | BODY MASS INDEX: 21.14 KG/M2

## 2019-08-01 DIAGNOSIS — R35.0 BENIGN PROSTATIC HYPERPLASIA WITH URINARY FREQUENCY: Primary | ICD-10-CM

## 2019-08-01 DIAGNOSIS — N52.8 OTHER MALE ERECTILE DYSFUNCTION: ICD-10-CM

## 2019-08-01 DIAGNOSIS — N40.0 ENLARGED PROSTATE WITHOUT LOWER URINARY TRACT SYMPTOMS (LUTS): ICD-10-CM

## 2019-08-01 DIAGNOSIS — F17.210 CIGARETTE NICOTINE DEPENDENCE WITHOUT COMPLICATION: ICD-10-CM

## 2019-08-01 DIAGNOSIS — N40.1 BENIGN PROSTATIC HYPERPLASIA WITH URINARY FREQUENCY: Primary | ICD-10-CM

## 2019-08-01 PROBLEM — R35.1 NOCTURIA: Status: RESOLVED | Noted: 2017-06-19 | Resolved: 2019-08-01

## 2019-08-01 LAB — POST-VOID RESIDUAL VOLUME, ML POC: 32 ML

## 2019-08-01 PROCEDURE — 51798 US URINE CAPACITY MEASURE: CPT | Performed by: PHYSICIAN ASSISTANT

## 2019-08-01 PROCEDURE — 99213 OFFICE O/P EST LOW 20 MIN: CPT | Performed by: PHYSICIAN ASSISTANT

## 2019-08-01 RX ORDER — FINASTERIDE 5 MG/1
5 TABLET, FILM COATED ORAL DAILY
Qty: 90 TABLET | Refills: 3 | Status: SHIPPED | OUTPATIENT
Start: 2019-08-01 | End: 2020-08-21 | Stop reason: SDUPTHER

## 2019-08-01 RX ORDER — TAMSULOSIN HYDROCHLORIDE 0.4 MG/1
0.4 CAPSULE ORAL
Qty: 90 CAPSULE | Refills: 3 | Status: SHIPPED | OUTPATIENT
Start: 2019-08-01 | End: 2020-09-21 | Stop reason: SDUPTHER

## 2019-08-01 NOTE — PROGRESS NOTES
1  Benign prostatic hyperplasia with urinary frequency  POCT Measure PVR    tamsulosin (FLOMAX) 0 4 mg    finasteride (PROSCAR) 5 mg tablet   2  Enlarged prostate without lower urinary tract symptoms (luts)     3  Cigarette nicotine dependence without complication     4  Other male erectile dysfunction           Assessment and plan:       1  BPH with lower urinary tract symptoms - managed by Dr Zaida Chicas  - patient very happy with urination on maximal medical management with tamsulosin and finasteride dual therapy  - Demonstrating adequate bladder emptying with 32 mL postvoid residual   - patient will follow up 1 year reassessment  He is aware to contact us sooner with any urinary concerns  2  Erectile dysfunction  - continue sildenafil as needed    3  Prostate cancer screening  - patient defers prostate cancer screening to his primary care provider    4  Tobacco use  - smoking cessation advised    Robert Berumen PA-C      Chief Complaint     Chief Complaint   Patient presents with    Benign Prostatic Hypertrophy         History of Present Illness     John Rabago is a 71 y o  male patient of Dr Zaida Chicas with a history of BPH with lower urinary tract symptoms and erectile dysfunction presenting for follow-up  Patient has been managed on tamsulosin and finasteride dual therapy  Patient is very happy with urination this time  A good stream and feels after urination, has nocturia 1-2 times nightly  Denies gross hematuria or dysuria  Patient has been utilizing sildenafil as needed for erectile dysfunction  Denies any side effects from this medication  Patient has significant tobacco exposure with approximately 3-4 packs per day for around 45 years  Denies any previous history of gross hematuria  Patient has previously deferred routine prostate cancer screening with Urology  Postvoid residual 32 mL       Laboratory     Lab Results   Component Value Date    CREATININE 0 86 09/15/2017 Review of Systems     Review of Systems   Constitutional: Negative for activity change, appetite change, chills, diaphoresis, fatigue, fever and unexpected weight change  Respiratory: Negative for chest tightness and shortness of breath  Cardiovascular: Negative for chest pain, palpitations and leg swelling  Gastrointestinal: Negative for abdominal distention, abdominal pain, constipation, diarrhea, nausea and vomiting  Genitourinary: Negative for decreased urine volume, difficulty urinating, dysuria, enuresis, flank pain, frequency, genital sores, hematuria and urgency  Musculoskeletal: Negative for back pain, gait problem and myalgias  Skin: Negative for color change, pallor, rash and wound  Psychiatric/Behavioral: Negative for behavioral problems  The patient is not nervous/anxious  AUA SYMPTOM SCORE      Most Recent Value   AUA SYMPTOM SCORE   How often have you had a sensation of not emptying your bladder completely after you finished urinating? 0   How often have you had to urinate again less than two hours after you finished urinating? 1   How often have you found you stopped and started again several times when you urinate?  0   How often have you found it difficult to postpone urination? 0   How often have you had a weak urinary stream?  0   How often have you had to push or strain to begin urination? 0   How many times did you most typically get up to urinate from the time you went to bed at night until the time you got up in the morning? 2   Quality of Life: If you were to spend the rest of your life with your urinary condition just the way it is now, how would you feel about that?  0   AUA SYMPTOM SCORE  3                Allergies     No Known Allergies    Physical Exam     Physical Exam   Constitutional: He is oriented to person, place, and time  He appears well-developed and well-nourished  No distress  HENT:   Head: Normocephalic and atraumatic     Eyes: Conjunctivae are normal    Neck: Normal range of motion  No tracheal deviation present  Pulmonary/Chest: Effort normal    Musculoskeletal: Normal range of motion  He exhibits no edema or deformity  Neurological: He is alert and oriented to person, place, and time  Skin: Skin is warm and dry  No rash noted  He is not diaphoretic  No erythema           Vital Signs     Vitals:    08/01/19 0855   BP: 138/70   BP Location: Left arm   Patient Position: Sitting   Cuff Size: Standard   Pulse: 62   Weight: 77 1 kg (170 lb)   Height: 6' 3" (1 905 m)         Current Medications       Current Outpatient Medications:     amLODIPine (NORVASC) 10 mg tablet, TAKE ONE TABLET BY MOUTH EVERY DAY, Disp: 90 tablet, Rfl: 3    finasteride (PROSCAR) 5 mg tablet, Take 1 tablet (5 mg total) by mouth daily, Disp: 90 tablet, Rfl: 3    lisinopril-hydrochlorothiazide (PRINZIDE,ZESTORETIC) 20-12 5 MG per tablet, Take 2 tablets by mouth daily, Disp: 180 tablet, Rfl: 3    methocarbamol (ROBAXIN) 500 mg tablet, Take 1 tablet (500 mg total) by mouth 2 (two) times a day, Disp: 20 tablet, Rfl: 0    naproxen (NAPROSYN) 500 mg tablet, Take 1 tablet (500 mg total) by mouth 2 (two) times a day with meals, Disp: 20 tablet, Rfl: 0    sildenafil (REVATIO) 20 mg tablet, Take 1 tablet (20 mg total) by mouth 3 (three) times a day Take 2-4 tablets as needed, Disp: 30 tablet, Rfl: 11    tamsulosin (FLOMAX) 0 4 mg, Take 1 capsule (0 4 mg total) by mouth daily with dinner, Disp: 90 capsule, Rfl: 3      Active Problems     Patient Active Problem List   Diagnosis    Allergic rhinitis    Cigarette nicotine dependence without complication    Enlarged prostate without lower urinary tract symptoms (luts)    Essential hypertension    Orthostatic hypotension    Cervical radiculopathy    Other male erectile dysfunction         Past Medical History     Past Medical History:   Diagnosis Date    BPH (benign prostatic hypertrophy)     Hypertension          Surgical History     Past Surgical History:   Procedure Laterality Date    COLONOSCOPY      INGUINAL HERNIA REPAIR Bilateral     AR COLONOSCOPY FLX DX W/COLLJ SPEC WHEN PFRMD N/A 10/5/2017    Procedure: COLONOSCOPY;  Surgeon: Michelle Pearson MD;  Location: [facility];   Service: Gastroenterology    TONSILLECTOMY      LESION ON TONSIL    UMBILICAL HERNIA REPAIR           Family History     Family History   Problem Relation Age of Onset    Brain cancer Mother     Heart disease Father     Kidney cancer Family          Social History     Social History       Radiology

## 2019-11-14 ENCOUNTER — OFFICE VISIT (OUTPATIENT)
Dept: UROLOGY | Facility: CLINIC | Age: 64
End: 2019-11-14
Payer: COMMERCIAL

## 2019-11-14 VITALS
SYSTOLIC BLOOD PRESSURE: 122 MMHG | DIASTOLIC BLOOD PRESSURE: 80 MMHG | BODY MASS INDEX: 21.14 KG/M2 | HEIGHT: 75 IN | WEIGHT: 170 LBS | HEART RATE: 70 BPM

## 2019-11-14 DIAGNOSIS — N40.1 BENIGN PROSTATIC HYPERPLASIA WITH URINARY FREQUENCY: ICD-10-CM

## 2019-11-14 DIAGNOSIS — R35.0 BENIGN PROSTATIC HYPERPLASIA WITH URINARY FREQUENCY: ICD-10-CM

## 2019-11-14 DIAGNOSIS — N50.89 TESTICULAR SWELLING: Primary | ICD-10-CM

## 2019-11-14 LAB
POST-VOID RESIDUAL VOLUME, ML POC: 107 ML
SL AMB  POCT GLUCOSE, UA: NORMAL
SL AMB LEUKOCYTE ESTERASE,UA: NORMAL
SL AMB POCT BILIRUBIN,UA: NORMAL
SL AMB POCT BLOOD,UA: NORMAL
SL AMB POCT CLARITY,UA: CLEAR
SL AMB POCT COLOR,UA: YELLOW
SL AMB POCT KETONES,UA: NORMAL
SL AMB POCT NITRITE,UA: NORMAL
SL AMB POCT PH,UA: 7
SL AMB POCT SPECIFIC GRAVITY,UA: 1.01
SL AMB POCT URINE PROTEIN: NORMAL
SL AMB POCT UROBILINOGEN: NORMAL

## 2019-11-14 PROCEDURE — 87077 CULTURE AEROBIC IDENTIFY: CPT | Performed by: PHYSICIAN ASSISTANT

## 2019-11-14 PROCEDURE — 51798 US URINE CAPACITY MEASURE: CPT | Performed by: PHYSICIAN ASSISTANT

## 2019-11-14 PROCEDURE — 99213 OFFICE O/P EST LOW 20 MIN: CPT | Performed by: PHYSICIAN ASSISTANT

## 2019-11-14 PROCEDURE — 87147 CULTURE TYPE IMMUNOLOGIC: CPT | Performed by: PHYSICIAN ASSISTANT

## 2019-11-14 PROCEDURE — 81002 URINALYSIS NONAUTO W/O SCOPE: CPT | Performed by: PHYSICIAN ASSISTANT

## 2019-11-14 PROCEDURE — 87086 URINE CULTURE/COLONY COUNT: CPT | Performed by: PHYSICIAN ASSISTANT

## 2019-11-14 PROCEDURE — 87186 SC STD MICRODIL/AGAR DIL: CPT | Performed by: PHYSICIAN ASSISTANT

## 2019-11-14 NOTE — PROGRESS NOTES
1  Testicular swelling  US scrotum and testicles    Urine culture   2  Benign prostatic hyperplasia with urinary frequency  POCT urine dip    POCT Measure PVR         Assessment and plan:       1  BPH with lower urinary tract symptoms - managed by Dr Cheryl Hernandez  - continue tamsulosin and finasteride dual therapy    2  Erectile dysfunction  - continue sildenafil as needed    3  Prostate cancer screening  - patient defers prostate cancer screening to his primary care provider    4  Tobacco use  - urine dip in the office today reveals blood  His urine specimen will be sent for urinalysis with microscopy for further evaluation  We did review should microscopic hematuria be present he will require  Hematuria evaluation with CT urogram and cystoscopy  5  Left testicular swelling  - physical examination consistent with hydrocele  Urine negative for evidence of infection  Patient will obtain a scrotal ultrasound with follow-up in the near future  Cyrus Mina PA-C      Chief Complaint     Acute visit testicular swelling    History of Present Illness     Anthony Hwang is a 71 y o  male patient of Dr Cheryl Hernandez with a history of BPH with lower urinary tract symptoms and erectile dysfunction presenting for an acute visit for testicular swelling  Patient has been managed on tamsulosin and finasteride dual therapy  Was recently seen in the office in August 2019 and demonstrating adequate bladder emptying  Patient has been utilizing sildenafil as needed for erectile dysfunction  Denies any side effects from this medication  Patient has significant tobacco exposure with approximately 3-4 packs per day for around 45 years  Denies any previous history of gross hematuria  Patient has previously deferred routine prostate cancer screening with Urology  Patient reports that over the past 2 months he has noticed gradual left testicular swelling  His has been stable over the past 2 weeks    He denies any testicular pain associated with this swelling  Denies any dysuria, gross hematuria, suprapubic pressure, flank pain, nausea, vomiting or fevers, or chills  Denies any scrotal trauma  Denies any concern for sexually transmitted diseases  Denies any penile lesions  Urinary Incontinence Screening      Most Recent Value   Urinary Incontinence   Urinary Incontinence? No   Incomplete emptying? No   Urinary frequency? No   Urinary urgency? No   Urinary hesitancy? No   Dysuria (painful difficult urination)? No   Nocturia (waking up to use the bathroom)? Yes [at least twice ]   Straining (having to push to go)? No   Weak stream?  No   Intermittent stream?  No   Post void dribbling? No          Laboratory     Lab Results   Component Value Date    CREATININE 0 86 09/15/2017       Review of Systems     Review of Systems   Constitutional: Negative for activity change, appetite change, chills, diaphoresis, fatigue, fever and unexpected weight change  Respiratory: Negative for chest tightness and shortness of breath  Cardiovascular: Negative for chest pain, palpitations and leg swelling  Gastrointestinal: Negative for abdominal distention, abdominal pain, constipation, diarrhea, nausea and vomiting  Genitourinary: Negative for decreased urine volume, difficulty urinating, dysuria, enuresis, flank pain, frequency, genital sores, hematuria and urgency  Musculoskeletal: Negative for back pain, gait problem and myalgias  Skin: Negative for color change, pallor, rash and wound  Psychiatric/Behavioral: Negative for behavioral problems  The patient is not nervous/anxious  Allergies     No Known Allergies    Physical Exam     Physical Exam   Constitutional: He is oriented to person, place, and time  He appears well-developed and well-nourished  No distress  HENT:   Head: Normocephalic and atraumatic  Eyes: Conjunctivae are normal    Neck: Normal range of motion   No tracheal deviation present  Pulmonary/Chest: Effort normal    Abdominal: Hernia confirmed negative in the right inguinal area and confirmed negative in the left inguinal area  Genitourinary: Penis normal  Right testis shows no mass, no swelling and no tenderness  Left testis shows swelling  Left testis shows no mass and no tenderness  No phimosis, paraphimosis, hypospadias or penile erythema  Genitourinary Comments: Large swelling of left testicle consistent with hydrocele  Nontender to palpation  No erythema, crepitus, ecchymosis, or drainage  No fluctuance  Musculoskeletal: Normal range of motion  He exhibits no edema or deformity  Neurological: He is alert and oriented to person, place, and time  Skin: Skin is warm and dry  No rash noted  He is not diaphoretic  No erythema           Vital Signs     Vitals:    11/14/19 0919   BP: 122/80   BP Location: Right arm   Patient Position: Sitting   Cuff Size: Standard   Pulse: 70   Weight: 77 1 kg (170 lb)   Height: 6' 3" (1 905 m)         Current Medications       Current Outpatient Medications:     lisinopril-hydrochlorothiazide (PRINZIDE,ZESTORETIC) 20-12 5 MG per tablet, Take 2 tablets by mouth daily, Disp: 180 tablet, Rfl: 3    tamsulosin (FLOMAX) 0 4 mg, Take 1 capsule (0 4 mg total) by mouth daily with dinner, Disp: 90 capsule, Rfl: 3    amLODIPine (NORVASC) 10 mg tablet, TAKE ONE TABLET BY MOUTH EVERY DAY, Disp: 90 tablet, Rfl: 3    finasteride (PROSCAR) 5 mg tablet, Take 1 tablet (5 mg total) by mouth daily (Patient not taking: Reported on 11/14/2019), Disp: 90 tablet, Rfl: 3    methocarbamol (ROBAXIN) 500 mg tablet, Take 1 tablet (500 mg total) by mouth 2 (two) times a day, Disp: 20 tablet, Rfl: 0    naproxen (NAPROSYN) 500 mg tablet, Take 1 tablet (500 mg total) by mouth 2 (two) times a day with meals (Patient not taking: Reported on 11/14/2019), Disp: 20 tablet, Rfl: 0    sildenafil (REVATIO) 20 mg tablet, Take 1 tablet (20 mg total) by mouth 3 (three) times a day Take 2-4 tablets as needed (Patient not taking: Reported on 11/14/2019), Disp: 30 tablet, Rfl: 11      Active Problems     Patient Active Problem List   Diagnosis    Allergic rhinitis    Cigarette nicotine dependence without complication    Enlarged prostate without lower urinary tract symptoms (luts)    Essential hypertension    Orthostatic hypotension    Cervical radiculopathy    Other male erectile dysfunction         Past Medical History     Past Medical History:   Diagnosis Date    BPH (benign prostatic hypertrophy)     Hypertension          Surgical History     Past Surgical History:   Procedure Laterality Date    COLONOSCOPY      INGUINAL HERNIA REPAIR Bilateral     CO COLONOSCOPY FLX DX W/COLLJ SPEC WHEN PFRMD N/A 10/5/2017    Procedure: COLONOSCOPY;  Surgeon: Bridgette Ramirez MD;  Location: [facility];   Service: Gastroenterology    TONSILLECTOMY      LESION ON TONSIL    UMBILICAL HERNIA REPAIR           Family History     Family History   Problem Relation Age of Onset    Brain cancer Mother     Heart disease Father     Kidney cancer Family          Social History     Social History       Radiology

## 2019-11-16 LAB — BACTERIA UR CULT: ABNORMAL

## 2019-11-18 ENCOUNTER — TELEPHONE (OUTPATIENT)
Dept: UROLOGY | Facility: CLINIC | Age: 64
End: 2019-11-18

## 2019-11-18 DIAGNOSIS — N30.00 ACUTE CYSTITIS WITHOUT HEMATURIA: Primary | ICD-10-CM

## 2019-11-18 RX ORDER — NITROFURANTOIN 25; 75 MG/1; MG/1
100 CAPSULE ORAL 2 TIMES DAILY
Qty: 14 CAPSULE | Refills: 0 | Status: SHIPPED | OUTPATIENT
Start: 2019-11-18 | End: 2019-11-25

## 2019-11-18 NOTE — TELEPHONE ENCOUNTER
Called and left message for patient to call the office back for urine results  Office number was left in the message

## 2019-11-18 NOTE — TELEPHONE ENCOUNTER
----- Message from Negra Valero PA-C sent at 11/18/2019  1:26 PM EST -----  Please let patient know that there was a low colony count growth in his urine culture  Given patient's symptoms in testicular swelling at his last appointment a prescription for nitrofurantoin x7 days sent electronically to his pharmacy  Please contact the patient to make aware  Thank you

## 2019-11-18 NOTE — TELEPHONE ENCOUNTER
Patients wife called back  There is no communication consent in patients chart stating that we are able to speak to her  Made her aware of this and requested that she have patient call the office for results

## 2019-11-21 NOTE — TELEPHONE ENCOUNTER
Call received from patient  He requested that the results be given to his wife  Discussed need for Medical release to be signed  He reported that his verbal should be enough  Medical release form emailed to patient's address on file with fax number for its return  Please advise    Thank you

## 2019-11-21 NOTE — TELEPHONE ENCOUNTER
Called and left third message for patient to call the office back for urine results   Office number was left in the message

## 2019-11-21 NOTE — TELEPHONE ENCOUNTER
Awaiting communication consent stating that we can speak to patients wife  Made  aware to look out for this fax to come though

## 2019-12-03 ENCOUNTER — HOSPITAL ENCOUNTER (OUTPATIENT)
Dept: ULTRASOUND IMAGING | Facility: HOSPITAL | Age: 64
Discharge: HOME | End: 2019-12-03
Payer: COMMERCIAL

## 2019-12-03 DIAGNOSIS — N50.89 TESTICULAR SWELLING: ICD-10-CM

## 2019-12-03 PROCEDURE — 76870 US EXAM SCROTUM: CPT

## 2019-12-03 NOTE — TELEPHONE ENCOUNTER
Called and spoke to patient  Patient is aware that urine testing from 11/14/19 was positivi for an infection and that an antibiotic was sent into this pharmacy on 11/18/19  Patient reports that he already finished this as prescribed  Patient denies any other questions or concerns  No new communication has been received yet with permission to speak to patients wife

## 2019-12-06 NOTE — TELEPHONE ENCOUNTER
Pt and wife came into [address] [place] [address] office 12/3/19  He completed Medical Comm Consent, allowing us to speak to his wife    Scanned into chart

## 2019-12-12 ENCOUNTER — OFFICE VISIT (OUTPATIENT)
Dept: UROLOGY | Facility: CLINIC | Age: 64
End: 2019-12-12
Payer: COMMERCIAL

## 2019-12-12 VITALS
HEART RATE: 79 BPM | BODY MASS INDEX: 21.36 KG/M2 | WEIGHT: 171.8 LBS | SYSTOLIC BLOOD PRESSURE: 110 MMHG | HEIGHT: 75 IN | DIASTOLIC BLOOD PRESSURE: 72 MMHG

## 2019-12-12 DIAGNOSIS — Z12.5 SCREENING FOR PROSTATE CANCER: ICD-10-CM

## 2019-12-12 DIAGNOSIS — N40.1 BENIGN PROSTATIC HYPERPLASIA WITH URINARY FREQUENCY: Primary | ICD-10-CM

## 2019-12-12 DIAGNOSIS — R35.0 BENIGN PROSTATIC HYPERPLASIA WITH URINARY FREQUENCY: Primary | ICD-10-CM

## 2019-12-12 LAB
SL AMB  POCT GLUCOSE, UA: NORMAL
SL AMB LEUKOCYTE ESTERASE,UA: NORMAL
SL AMB POCT BILIRUBIN,UA: NORMAL
SL AMB POCT BLOOD,UA: 1.01
SL AMB POCT CLARITY,UA: CLEAR
SL AMB POCT COLOR,UA: YELLOW
SL AMB POCT KETONES,UA: NORMAL
SL AMB POCT NITRITE,UA: NORMAL
SL AMB POCT PH,UA: NORMAL
SL AMB POCT SPECIFIC GRAVITY,UA: 5
SL AMB POCT URINE PROTEIN: 5
SL AMB POCT UROBILINOGEN: 0.2

## 2019-12-12 PROCEDURE — 81002 URINALYSIS NONAUTO W/O SCOPE: CPT | Performed by: PHYSICIAN ASSISTANT

## 2019-12-12 PROCEDURE — 99213 OFFICE O/P EST LOW 20 MIN: CPT | Performed by: PHYSICIAN ASSISTANT

## 2019-12-12 NOTE — PROGRESS NOTES
1  Benign prostatic hyperplasia with urinary frequency  POCT urine dip   2  Screening for prostate cancer  PSA, Total Screen       Assessment and plan:       1  BPH with lower urinary tract symptoms - managed by Dr Sandro Underwood  - continue tamsulosin and finasteride dual therapy    2  Erectile dysfunction  - continue sildenafil as needed    3  Prostate cancer screening  - patient defers prostate cancer screening to his primary care provider    4  Tobacco use    5  Left spermatocele  - I reviewed with the patient his ultrasound findings of a large left spermatocele/complex hydrocele  We reviewed options of observation versus surgical removal   Patient reports that he is overall comfortable with it at this time and wishes to continue to observe  He will follow up in 6 months for re-evaluation  Encouraged contact us in the meantime with any concerns  All questions answered  Niel Sacks, PA-C      Chief Complaint     Acute visit testicular swelling    History of Present Illness     Cem Grossman is a 71 y o  male patient of Dr Sandro Underwood with a history of BPH with lower urinary tract symptoms and erectile dysfunction presenting for an acute visit for testicular swelling  Patient has been managed on tamsulosin and finasteride dual therapy  Was recently seen in the office in August 2019 and demonstrating adequate bladder emptying  Patient has been utilizing sildenafil as needed for erectile dysfunction  Denies any side effects from this medication  Patient has significant tobacco exposure with approximately 3-4 packs per day for around 45 years  Denies any previous history of gross hematuria  Patient has previously deferred routine prostate cancer screening with Urology  Patient reports that over the past 2 months he has noticed gradual left testicular swelling  His has been stable over the past 2 weeks  He denies any testicular pain associated with this swelling    Denies any dysuria, gross hematuria, suprapubic pressure, flank pain, nausea, vomiting or fevers, or chills  Denies any scrotal trauma  Denies any concern for sexually transmitted diseases  Denies any penile lesions  Urinary Incontinence Screening      Most Recent Value   Urinary Incontinence   Urinary Incontinence? No   Incomplete emptying? No   Urinary frequency? No   Urinary urgency? No   Urinary hesitancy? No   Dysuria (painful difficult urination)? No   Nocturia (waking up to use the bathroom)? Yes [at least twice ]   Straining (having to push to go)? No   Weak stream?  No   Intermittent stream?  No   Post void dribbling? No          Laboratory     Lab Results   Component Value Date    CREATININE 0 86 09/15/2017       Review of Systems     Review of Systems   Constitutional: Negative for activity change, appetite change, chills, diaphoresis, fatigue, fever and unexpected weight change  Respiratory: Negative for chest tightness and shortness of breath  Cardiovascular: Negative for chest pain, palpitations and leg swelling  Gastrointestinal: Negative for abdominal distention, abdominal pain, constipation, diarrhea, nausea and vomiting  Genitourinary: Negative for decreased urine volume, difficulty urinating, dysuria, enuresis, flank pain, frequency, genital sores, hematuria and urgency  Musculoskeletal: Negative for back pain, gait problem and myalgias  Skin: Negative for color change, pallor, rash and wound  Psychiatric/Behavioral: Negative for behavioral problems  The patient is not nervous/anxious  Allergies     No Known Allergies    Physical Exam     Physical Exam   Constitutional: He is oriented to person, place, and time  He appears well-developed and well-nourished  No distress  HENT:   Head: Normocephalic and atraumatic  Eyes: Conjunctivae are normal    Neck: Normal range of motion  No tracheal deviation present     Pulmonary/Chest: Effort normal    Abdominal: Hernia confirmed negative in the right inguinal area and confirmed negative in the left inguinal area  Genitourinary: Penis normal  Right testis shows no mass, no swelling and no tenderness  Left testis shows swelling  Left testis shows no mass and no tenderness  No phimosis, paraphimosis, hypospadias or penile erythema  Genitourinary Comments: Large swelling of left testicle consistent with hydrocele  Nontender to palpation  No erythema, crepitus, ecchymosis, or drainage  No fluctuance  Musculoskeletal: Normal range of motion  He exhibits no edema or deformity  Neurological: He is alert and oriented to person, place, and time  Skin: Skin is warm and dry  No rash noted  He is not diaphoretic  No erythema           Vital Signs     Vitals:    12/12/19 1304   BP: 110/72   Pulse: 79   Weight: 77 9 kg (171 lb 12 8 oz)   Height: 6' 3" (1 905 m)         Current Medications       Current Outpatient Medications:     amLODIPine (NORVASC) 10 mg tablet, TAKE ONE TABLET BY MOUTH EVERY DAY, Disp: 90 tablet, Rfl: 3    finasteride (PROSCAR) 5 mg tablet, Take 1 tablet (5 mg total) by mouth daily, Disp: 90 tablet, Rfl: 3    lisinopril-hydrochlorothiazide (PRINZIDE,ZESTORETIC) 20-12 5 MG per tablet, Take 2 tablets by mouth daily, Disp: 180 tablet, Rfl: 3    methocarbamol (ROBAXIN) 500 mg tablet, Take 1 tablet (500 mg total) by mouth 2 (two) times a day, Disp: 20 tablet, Rfl: 0    naproxen (NAPROSYN) 500 mg tablet, Take 1 tablet (500 mg total) by mouth 2 (two) times a day with meals, Disp: 20 tablet, Rfl: 0    sildenafil (REVATIO) 20 mg tablet, Take 1 tablet (20 mg total) by mouth 3 (three) times a day Take 2-4 tablets as needed, Disp: 30 tablet, Rfl: 11    tamsulosin (FLOMAX) 0 4 mg, Take 1 capsule (0 4 mg total) by mouth daily with dinner, Disp: 90 capsule, Rfl: 3      Active Problems     Patient Active Problem List   Diagnosis    Allergic rhinitis    Cigarette nicotine dependence without complication    Enlarged prostate without lower urinary tract symptoms (luts)    Essential hypertension    Orthostatic hypotension    Cervical radiculopathy    Other male erectile dysfunction         Past Medical History     Past Medical History:   Diagnosis Date    BPH (benign prostatic hypertrophy)     Hypertension          Surgical History     Past Surgical History:   Procedure Laterality Date    COLONOSCOPY      INGUINAL HERNIA REPAIR Bilateral     IN COLONOSCOPY FLX DX W/COLLJ SPEC WHEN PFRMD N/A 10/5/2017    Procedure: COLONOSCOPY;  Surgeon: Raman Vizcaino MD;  Location: [facility];   Service: Gastroenterology    TONSILLECTOMY      LESION ON TONSIL    UMBILICAL HERNIA REPAIR           Family History     Family History   Problem Relation Age of Onset    Brain cancer Mother     Heart disease Father     Kidney cancer Family          Social History     Social History       Radiology

## 2019-12-14 DIAGNOSIS — I10 ESSENTIAL HYPERTENSION: ICD-10-CM

## 2019-12-16 RX ORDER — AMLODIPINE BESYLATE 10 MG/1
TABLET ORAL
Qty: 90 TABLET | Refills: 3 | Status: SHIPPED | OUTPATIENT
Start: 2019-12-16 | End: 2020-03-23 | Stop reason: SDUPTHER

## 2020-01-08 ENCOUNTER — TELEPHONE (OUTPATIENT)
Dept: INTERNAL MEDICINE CLINIC | Facility: CLINIC | Age: 65
End: 2020-01-08

## 2020-03-05 DIAGNOSIS — I10 ESSENTIAL HYPERTENSION: ICD-10-CM

## 2020-03-05 RX ORDER — LISINOPRIL AND HYDROCHLOROTHIAZIDE 20; 12.5 MG/1; MG/1
TABLET ORAL
Qty: 180 TABLET | Refills: 3 | OUTPATIENT
Start: 2020-03-05

## 2020-03-09 DIAGNOSIS — I10 ESSENTIAL HYPERTENSION: ICD-10-CM

## 2020-03-09 RX ORDER — LISINOPRIL AND HYDROCHLOROTHIAZIDE 20; 12.5 MG/1; MG/1
2 TABLET ORAL DAILY
Qty: 10 TABLET | Refills: 0 | Status: SHIPPED | OUTPATIENT
Start: 2020-03-09 | End: 2020-03-13 | Stop reason: SDUPTHER

## 2020-03-13 ENCOUNTER — OFFICE VISIT (OUTPATIENT)
Dept: INTERNAL MEDICINE CLINIC | Facility: CLINIC | Age: 65
End: 2020-03-13
Payer: COMMERCIAL

## 2020-03-13 ENCOUNTER — LAB (OUTPATIENT)
Dept: LAB | Facility: CLINIC | Age: 65
End: 2020-03-13
Payer: COMMERCIAL

## 2020-03-13 ENCOUNTER — TELEPHONE (OUTPATIENT)
Dept: INTERNAL MEDICINE CLINIC | Facility: CLINIC | Age: 65
End: 2020-03-13

## 2020-03-13 VITALS
DIASTOLIC BLOOD PRESSURE: 82 MMHG | WEIGHT: 174 LBS | HEART RATE: 73 BPM | BODY MASS INDEX: 21.75 KG/M2 | OXYGEN SATURATION: 96 % | SYSTOLIC BLOOD PRESSURE: 124 MMHG

## 2020-03-13 DIAGNOSIS — Z11.59 NEED FOR HEPATITIS C SCREENING TEST: ICD-10-CM

## 2020-03-13 DIAGNOSIS — F17.210 CIGARETTE NICOTINE DEPENDENCE WITHOUT COMPLICATION: ICD-10-CM

## 2020-03-13 DIAGNOSIS — I10 ESSENTIAL HYPERTENSION: ICD-10-CM

## 2020-03-13 DIAGNOSIS — I10 ESSENTIAL HYPERTENSION: Primary | ICD-10-CM

## 2020-03-13 DIAGNOSIS — N40.0 ENLARGED PROSTATE WITHOUT LOWER URINARY TRACT SYMPTOMS (LUTS): ICD-10-CM

## 2020-03-13 PROBLEM — M54.12 CERVICAL RADICULOPATHY: Status: RESOLVED | Noted: 2018-08-10 | Resolved: 2020-03-13

## 2020-03-13 LAB
ALBUMIN SERPL BCP-MCNC: 3.7 G/DL (ref 3.5–5)
ALP SERPL-CCNC: 102 U/L (ref 46–116)
ALT SERPL W P-5'-P-CCNC: 18 U/L (ref 12–78)
ANION GAP SERPL CALCULATED.3IONS-SCNC: 7 MMOL/L (ref 4–13)
AST SERPL W P-5'-P-CCNC: 15 U/L (ref 5–45)
BASOPHILS # BLD AUTO: 0.07 THOUSANDS/ΜL (ref 0–0.1)
BASOPHILS NFR BLD AUTO: 1 % (ref 0–1)
BILIRUB SERPL-MCNC: 0.54 MG/DL (ref 0.2–1)
BUN SERPL-MCNC: 21 MG/DL (ref 5–25)
CALCIUM SERPL-MCNC: 8.8 MG/DL (ref 8.3–10.1)
CHLORIDE SERPL-SCNC: 96 MMOL/L (ref 100–108)
CHOLEST SERPL-MCNC: 181 MG/DL (ref 50–200)
CO2 SERPL-SCNC: 29 MMOL/L (ref 21–32)
CREAT SERPL-MCNC: 0.82 MG/DL (ref 0.6–1.3)
EOSINOPHIL # BLD AUTO: 0.13 THOUSAND/ΜL (ref 0–0.61)
EOSINOPHIL NFR BLD AUTO: 2 % (ref 0–6)
ERYTHROCYTE [DISTWIDTH] IN BLOOD BY AUTOMATED COUNT: 14.5 % (ref 11.6–15.1)
GFR SERPL CREATININE-BSD FRML MDRD: 93 ML/MIN/1.73SQ M
GLUCOSE P FAST SERPL-MCNC: 68 MG/DL (ref 65–99)
HCT VFR BLD AUTO: 44.4 % (ref 36.5–49.3)
HCV AB SER QL: NORMAL
HDLC SERPL-MCNC: 98 MG/DL
HGB BLD-MCNC: 15.3 G/DL (ref 12–17)
IMM GRANULOCYTES # BLD AUTO: 0.02 THOUSAND/UL (ref 0–0.2)
IMM GRANULOCYTES NFR BLD AUTO: 0 % (ref 0–2)
LDLC SERPL CALC-MCNC: 76 MG/DL (ref 0–100)
LYMPHOCYTES # BLD AUTO: 2.41 THOUSANDS/ΜL (ref 0.6–4.47)
LYMPHOCYTES NFR BLD AUTO: 29 % (ref 14–44)
MCH RBC QN AUTO: 31.9 PG (ref 26.8–34.3)
MCHC RBC AUTO-ENTMCNC: 34.5 G/DL (ref 31.4–37.4)
MCV RBC AUTO: 93 FL (ref 82–98)
MONOCYTES # BLD AUTO: 0.85 THOUSAND/ΜL (ref 0.17–1.22)
MONOCYTES NFR BLD AUTO: 10 % (ref 4–12)
NEUTROPHILS # BLD AUTO: 4.73 THOUSANDS/ΜL (ref 1.85–7.62)
NEUTS SEG NFR BLD AUTO: 58 % (ref 43–75)
NONHDLC SERPL-MCNC: 83 MG/DL
NRBC BLD AUTO-RTO: 0 /100 WBCS
PLATELET # BLD AUTO: 287 THOUSANDS/UL (ref 149–390)
PMV BLD AUTO: 8.9 FL (ref 8.9–12.7)
POTASSIUM SERPL-SCNC: 3.7 MMOL/L (ref 3.5–5.3)
PROT SERPL-MCNC: 7.7 G/DL (ref 6.4–8.2)
RBC # BLD AUTO: 4.8 MILLION/UL (ref 3.88–5.62)
SODIUM SERPL-SCNC: 132 MMOL/L (ref 136–145)
TRIGL SERPL-MCNC: 33 MG/DL
TSH SERPL DL<=0.05 MIU/L-ACNC: 0.97 UIU/ML (ref 0.36–3.74)
WBC # BLD AUTO: 8.21 THOUSAND/UL (ref 4.31–10.16)

## 2020-03-13 PROCEDURE — 3288F FALL RISK ASSESSMENT DOCD: CPT | Performed by: INTERNAL MEDICINE

## 2020-03-13 PROCEDURE — 36415 COLL VENOUS BLD VENIPUNCTURE: CPT

## 2020-03-13 PROCEDURE — 99214 OFFICE O/P EST MOD 30 MIN: CPT | Performed by: INTERNAL MEDICINE

## 2020-03-13 PROCEDURE — 1101F PT FALLS ASSESS-DOCD LE1/YR: CPT | Performed by: INTERNAL MEDICINE

## 2020-03-13 PROCEDURE — 4004F PT TOBACCO SCREEN RCVD TLK: CPT | Performed by: INTERNAL MEDICINE

## 2020-03-13 PROCEDURE — 84443 ASSAY THYROID STIM HORMONE: CPT

## 2020-03-13 PROCEDURE — 80061 LIPID PANEL: CPT

## 2020-03-13 PROCEDURE — 80053 COMPREHEN METABOLIC PANEL: CPT

## 2020-03-13 PROCEDURE — 1160F RVW MEDS BY RX/DR IN RCRD: CPT | Performed by: INTERNAL MEDICINE

## 2020-03-13 PROCEDURE — 86803 HEPATITIS C AB TEST: CPT

## 2020-03-13 PROCEDURE — 4040F PNEUMOC VAC/ADMIN/RCVD: CPT | Performed by: INTERNAL MEDICINE

## 2020-03-13 PROCEDURE — 3079F DIAST BP 80-89 MM HG: CPT | Performed by: INTERNAL MEDICINE

## 2020-03-13 PROCEDURE — 85025 COMPLETE CBC W/AUTO DIFF WBC: CPT

## 2020-03-13 PROCEDURE — 3074F SYST BP LT 130 MM HG: CPT | Performed by: INTERNAL MEDICINE

## 2020-03-13 RX ORDER — LISINOPRIL AND HYDROCHLOROTHIAZIDE 20; 12.5 MG/1; MG/1
2 TABLET ORAL DAILY
Qty: 10 TABLET | Refills: 0 | Status: SHIPPED | OUTPATIENT
Start: 2020-03-13 | End: 2020-03-23 | Stop reason: SDUPTHER

## 2020-03-13 NOTE — TELEPHONE ENCOUNTER
----- Message from Christian Gomez MD sent at 3/13/2020  3:31 PM EDT -----  Labs ok, please call and inform patient

## 2020-03-13 NOTE — PROGRESS NOTES
INTERNAL MEDICINE OFFICE VISIT  [facility] [place]  [facility]  Tel: [phone]      NAME: Glory Sánchez  AGE: 71 y o  SEX: male  : 1955   MRN: [id number]    DATE: 3/13/2020  TIME: 12:42 PM      Assessment and Plan:  1  Essential hypertension  Continue present medications    - lisinopril-hydrochlorothiazide (PRINZIDE,ZESTORETIC) 20-12 5 MG per tablet; Take 2 tablets by mouth daily  Dispense: 10 tablet; Refill: 0  - CBC and differential; Future  - Comprehensive metabolic panel; Future  - Lipid panel; Future  - TSH, 3rd generation; Future    2  Enlarged prostate without lower urinary tract symptoms (luts)  FU with urology    3  Cigarette nicotine dependence without complication    Tobacco Cessation Counseling: Tobacco cessation counseling was provided  The patient is sincerely urged to quit consumption of tobacco  He is not ready to quit tobacco  Medication options not discussed  - [place] lung screening program; Future    4  Need for hepatitis C screening test    - Hepatitis C antibody; Future      - Counseling Documentation: patient was counseled regarding: instructions for management, risk factor reductions, prognosis, patient and family education, risks and benefits of treatment options and importance of compliance with treatment  - Medication Side Effects: Adverse side effects of medications were reviewed with the patient/guardian today  Return for follow up visit in as needed or earlier, if needed        Chief Complaint:  Chief Complaint   Patient presents with    Follow-up    Medication Refill         History of Present Illness:   Hypertension- Blood pressure is stable  BPH- controlled with medications  Smoker- was counseled to quit        Active Problem List:  Patient Active Problem List   Diagnosis    Allergic rhinitis    Cigarette nicotine dependence without complication    Enlarged prostate without lower urinary tract symptoms (luts)    Essential hypertension    Orthostatic hypotension    Other male erectile dysfunction         Past Medical History:  Past Medical History:   Diagnosis Date    BPH (benign prostatic hypertrophy)     Cervical radiculopathy 8/10/2018    Hypertension          Past Surgical History:  Past Surgical History:   Procedure Laterality Date    COLONOSCOPY      INGUINAL HERNIA REPAIR Bilateral     [place] COLONOSCOPY FLX DX W/COLLJ SPEC WHEN PFRMD N/A 10/5/2017    Procedure: COLONOSCOPY;  Surgeon: Danuta Cole MD;  Location: [facility]; Service: Gastroenterology    TONSILLECTOMY      LESION ON TONSIL    UMBILICAL HERNIA REPAIR           Family History:  Family History   Problem Relation Age of Onset    Brain cancer Mother     Heart disease Father     Kidney cancer Family          Social History:  Social History     Socioeconomic History    Marital status: /Civil Union     Spouse name: None    Number of children: None    Years of education: None    Highest education level: None   Occupational History    None   Social Needs    Financial resource strain: None    Food insecurity:     Worry: None     Inability: None    Transportation needs:     Medical: None     Non-medical: None   Tobacco Use    Smoking status: Current Every Day Smoker     Packs/day: 0 75     Years: 30 00     Pack years: 22 50     Types: Cigarettes    Smokeless tobacco: Never Used   Substance and Sexual Activity    Alcohol use:  Yes     Alcohol/week: 21 0 standard drinks     Types: 21 Cans of beer per week     Frequency: 4 or more times a week     Drinks per session: 3 or 4     Binge frequency: Never    Drug use: No    Sexual activity: Yes     Partners: Female   Lifestyle    Physical activity:     Days per week: 0 days     Minutes per session: 0 min    Stress: Not at all   Relationships    Social connections:     Talks on phone: None     Gets together: None     Attends Temple service: None     Active member of club or organization: None     Attends meetings of clubs or organizations: None     Relationship status: None    Intimate partner violence:     Fear of current or ex partner: None     Emotionally abused: None     Physically abused: None     Forced sexual activity: None   Other Topics Concern    None   Social History Narrative    None         Allergies:  No Known Allergies      Medications:    Current Outpatient Medications:     amLODIPine (NORVASC) 10 mg tablet, TAKE ONE TABLET BY MOUTH EVERY DAY, Disp: 90 tablet, Rfl: 3    finasteride (PROSCAR) 5 mg tablet, Take 1 tablet (5 mg total) by mouth daily, Disp: 90 tablet, Rfl: 3    lisinopril-hydrochlorothiazide (PRINZIDE,ZESTORETIC) 20-12 5 MG per tablet, Take 2 tablets by mouth daily, Disp: 10 tablet, Rfl: 0    sildenafil (REVATIO) 20 mg tablet, Take 1 tablet (20 mg total) by mouth 3 (three) times a day Take 2-4 tablets as needed, Disp: 30 tablet, Rfl: 11    tamsulosin (FLOMAX) 0 4 mg, Take 1 capsule (0 4 mg total) by mouth daily with dinner, Disp: 90 capsule, Rfl: 3      The following portions of the patient's history were reviewed and updated as appropriate: past medical history, past surgical history, family history, social history, allergies, current medications and active problem list       Review of Systems:  Constitutional: Denies fever, chills, weight gain, weight loss, fatigue  Eyes: Denies eye redness, eye discharge, double vision, change in visual acuity  ENT: Denies hearing loss, tinnitus, sneezing, nasal congestion, nasal discharge, sore throat   Respiratory: Denies cough, expectoration, hemoptysis, shortness of breath, wheezing  Cardiovascular: Denies chest pain, palpitations, lower extremity swelling, orthopnea, PND  Gastrointestinal: Denies abdominal pain, heartburn, nausea, vomiting, hematemesis, diarrhea, bloody stools  Genito-Urinary: Denies dysuria, frequency, difficulty in micturition, nocturia, incontinence  Musculoskeletal: Denies back pain, joint pain, muscle pain  Neurologic: Denies confusion, lightheadedness, syncope, headache, focal weakness, sensory changes, seizures  Endocrine: Denies polyuria, polydipsia, temperature intolerance  Allergy and Immunology: Denies hives, insect bite sensitivity  Hematological and Lymphatic: Denies bleeding problems, swollen glands   Psychological: Denies depression, suicidal ideation, anxiety, panic, mood swings  Dermatological: Denies pruritus, rash, skin lesion changes      Vitals:  Vitals:    03/13/20 1102   BP: 124/82   Pulse: 73   SpO2: 96%       Body mass index is 21 75 kg/m²  Weight (last 2 days)     Date/Time   Weight    03/13/20 1102   78 9 (174) w/ shoes denied off    Weight: w/ shoes denied off at 03/13/20 1102                Physical Examination:  General: Patient is not in acute distress  Awake, alert, responding to commands  No weight gain or loss  Head: Normocephalic  Atraumatic  Eyes: Conjunctiva and lids with no swelling, erythema or discharge  Both pupils normal sized, round and reactive to light  Sclera nonicteric  ENT: External examination of nose and ear normal  Otoscopic examination shows translucent tympanic membranes with patent canals without erythema  Oropharynx moist with no erythema, edema, exudate or lesions  Neck: Supple  JVP not raised  Trachea midline  No masses  No thyromegaly  Lungs: No signs of increased work of breathing or respiratory distress  Bilateral bronchovascular breath sounds with no crackles or rhonchi  Chest wall: No tenderness  Cardiovascular: Normal PMI  No thrills  Regular rate and rhythm  S1 and S2 normal  No murmur, rub or gallop  Gastrointestinal: Abdomen soft, nontender  No guarding or rigidity  Liver and spleen not palpable  Bowel sounds present  Neurologic: Cranial nerves II-XII intact   Cortical functions normal  Motor system - Reflexes 2+ and symmetrical  Sensations normal  Musculoskeletal: Gait normal  No joint tenderness  Integumentary: Skin normal with no rash or lesions  Lymphatic: No palpable lymph nodes in neck, axilla or groin  Extremities: No clubbing, cyanosis, edema or varicosities  Psychological: Judgement and insight normal  Mood and affect normal      Laboratory Results:  CBC with diff:   Lab Results   Component Value Date    WBC 9 08 09/15/2017    WBC 8 9 07/30/2015    RBC 5 04 09/15/2017    RBC 4 73 07/30/2015    HGB 16 6 09/15/2017    HGB 15 5 07/30/2015    HCT 45 5 09/15/2017    HCT 44 6 07/30/2015    MCV 90 09/15/2017    MCV 94 07/30/2015    MCH 32 9 09/15/2017    MCH 32 7 07/30/2015    RDW 14 4 09/15/2017    RDW 12 8 07/30/2015     09/15/2017     07/30/2015       CMP:  Lab Results   Component Value Date    CREATININE 0 86 09/15/2017    CREATININE 1 0 07/30/2015    BUN 11 09/15/2017    BUN 27 (H) 07/30/2015     07/30/2015    K 4 0 09/15/2017    K 3 9 07/30/2015    CL 94 (L) 09/15/2017     07/30/2015    CO2 29 09/15/2017    CO2 27 7 07/30/2015    GLUCOSE 81 07/30/2015    PROT 7 1 07/30/2015    ALKPHOS 77 09/15/2017    ALKPHOS 61 07/30/2015    ALT 26 09/15/2017    ALT 30 07/30/2015    AST 19 09/15/2017    AST 21 07/30/2015       No results found for: HGBA1C, MG, PHOS    No results found for: TROPONINI, CKMB, CKTOTAL    Lipid Profile:   Lab Results   Component Value Date    CHOL 181 07/30/2015     Lab Results   Component Value Date    HDL 93 (H) 09/15/2017     (H) 03/02/2017     Lab Results   Component Value Date    LDLCALC 65 09/15/2017    [address] 03/02/2017     Lab Results   Component Value Date    TRIG 45 09/15/2017    TRIG 40 03/02/2017       Imaging Results:  US scrotum and testicles  Narrative: SCROTAL ULTRASOUND    INDICATION:    N50 89: Other specified disorders of the male genital organs  COMPARISON: None    TECHNIQUE:   Ultrasound the scrotal contents was performed with a high frequency linear transducer utilizing volumetric sweep imaging as well as standard still image techniques  Imaging performed in longitudinal and transverse orientation  Color and   spectral Doppler evaluation also performed bilaterally  FINDINGS:    There is a cystic mass in the left hemiscrotum, 9 3 x 6 4 x 7 1 cm  There are thin septations within this cyst and there is subtle punctate calcification at the cyst margin  The mass results in marked mass effect on the left testicle which is flattened   and elongated as a result of the mass effect  This mass probably represents a large epididymal cyst     TESTES:   The left testicle is misshapen is result of mass effect related to very large cystic mass in the left hemiscrotum  RIGHT testis = 4 7 x 2 0 x 3 0 cm   Normal contour with homogeneous smooth echotexture  No intratesticular mass lesion or calcifications  LEFT testis = 5 5 x 1 6 x 3 3 cm  No intratesticular mass lesion or calcifications  Doppler flow within both testes is present and symmetric  EPIDIDYMIDES:   Doppler ultrasound demonstrates normal blood flow  The large cystic lesion in the left hemiscrotum probably represents an epididymal cyst   No solid epididymal lesions are identified  HYDROCELE:  No right-sided hydrocele  Large cystic mass in the left hemiscrotum, probably an epididymal cyst, less likely hydrocele  VARICOCELE:  None present  SCROTUM:  Scrotal thickness and appearance within normal limits  No evidence for extratesticular mass or hernia demonstrated  Impression:    Large cystic mass in the left hemiscrotum, up to 9 3 cm in greatest dimension with few septations probably represents a very large epididymal cyst resulting in significant mass effect on the left testicle  Less likely, this could represent a loculated   hydrocele      Workstation performed: [id number]       Health Maintenance:  Health Maintenance   Topic Date Due    Hepatitis C Screening  1955    DTaP,Tdap,and Td Vaccines (1 - Tdap) 03/14/1965    HIV Screening  03/14/1969    Annual Physical 03/14/1972    Pneumococcal Vaccine: 65+ Years (1 of 2 - PCV13) 03/14/2019    CRC Screening: Colonoscopy  10/05/2020    Fall Risk  03/13/2021    Depression Screening PHQ  03/13/2021    BMI: Adult  03/13/2021    Influenza Vaccine  Completed    Pneumococcal Vaccine: Pediatrics (0 to 5 Years) and At-Risk Patients (6 to 59 Years)  Aged Out    HIB Vaccine  Aged Out    Hepatitis B Vaccine  Aged Out    IPV Vaccine  Aged Out    Hepatitis A Vaccine  Aged Out    Meningococcal ACWY Vaccine  Aged Out    HPV Vaccine  Aged [employer] History   Administered Date(s) Administered    INFLUENZA 10/23/2018    Influenza TIV (IM) 1955    Influenza, injectable, quadrivalent, preservative free 0 5 mL 10/23/2018    Pneumococcal Polysaccharide PPV23 1955    Tdap 1955    Zoster 1955    influenza, trivalent, adjuvanted 09/10/2019         Gunjan Hurt MD  3/13/2020,12:42 PM

## 2020-03-21 DIAGNOSIS — I10 ESSENTIAL HYPERTENSION: ICD-10-CM

## 2020-03-23 ENCOUNTER — TELEPHONE (OUTPATIENT)
Dept: INTERNAL MEDICINE CLINIC | Facility: CLINIC | Age: 65
End: 2020-03-23

## 2020-03-23 DIAGNOSIS — I10 ESSENTIAL HYPERTENSION: ICD-10-CM

## 2020-03-23 RX ORDER — AMLODIPINE BESYLATE 10 MG/1
10 TABLET ORAL DAILY
Qty: 90 TABLET | Refills: 3 | Status: SHIPPED | OUTPATIENT
Start: 2020-03-23 | End: 2021-03-24

## 2020-03-23 RX ORDER — LISINOPRIL AND HYDROCHLOROTHIAZIDE 20; 12.5 MG/1; MG/1
TABLET ORAL
Qty: 10 TABLET | Refills: 0 | Status: SHIPPED | OUTPATIENT
Start: 2020-03-23

## 2020-03-23 RX ORDER — LISINOPRIL AND HYDROCHLOROTHIAZIDE 20; 12.5 MG/1; MG/1
2 TABLET ORAL DAILY
Qty: 180 TABLET | Refills: 1 | Status: SHIPPED | OUTPATIENT
Start: 2020-03-23 | End: 2020-09-14

## 2020-03-23 NOTE — TELEPHONE ENCOUNTER
One BP med was filled  Saw the dr on 3/13  [facility]    Lisinopril-hydrochlorothiazide, 20-12 5 MG per tablet; 2 tab daily, this was not filled- but it looks like it was filled  Only had 12 pills left; needed New script  Other BP med is: amLODIPine, 10 mg tablet    Please check into it

## 2020-06-15 ENCOUNTER — TELEMEDICINE (OUTPATIENT)
Dept: UROLOGY | Facility: CLINIC | Age: 65
End: 2020-06-15

## 2020-06-15 DIAGNOSIS — R35.0 BENIGN PROSTATIC HYPERPLASIA WITH URINARY FREQUENCY: ICD-10-CM

## 2020-06-15 DIAGNOSIS — Z12.5 SCREENING FOR PROSTATE CANCER: Primary | ICD-10-CM

## 2020-06-15 DIAGNOSIS — N40.1 BENIGN PROSTATIC HYPERPLASIA WITH URINARY FREQUENCY: ICD-10-CM

## 2020-06-15 PROCEDURE — 99442 PR PHYS/QHP TELEPHONE EVALUATION 11-20 MIN: CPT | Performed by: PHYSICIAN ASSISTANT

## 2020-08-21 DIAGNOSIS — R35.0 BENIGN PROSTATIC HYPERPLASIA WITH URINARY FREQUENCY: ICD-10-CM

## 2020-08-21 DIAGNOSIS — N40.1 BENIGN PROSTATIC HYPERPLASIA WITH URINARY FREQUENCY: ICD-10-CM

## 2020-08-21 RX ORDER — FINASTERIDE 5 MG/1
5 TABLET, FILM COATED ORAL DAILY
Qty: 90 TABLET | Refills: 3 | Status: SHIPPED | OUTPATIENT
Start: 2020-08-21

## 2020-08-21 NOTE — TELEPHONE ENCOUNTER
An Auto-fax Refill Request for Finasteride 5mg was received from [facility]/pharmacy #[id number]  The patient was last evaluated via telemedicine or video visit on 6/15/20 with Kira Everett PA-C out of the [facility] location; continuation of the medication was authorized at that time    Request for same, 90 day supply with 3 refills was queued and forwarded to the Advanced Practitioner covering the [facility] location for approval

## 2020-09-09 ENCOUNTER — DOCUMENTATION (OUTPATIENT)
Dept: GASTROENTEROLOGY | Facility: CLINIC | Age: 65
End: 2020-09-09

## 2020-09-09 NOTE — LETTER
9/9/2020    Dear Neelima Anguiano,    Review of our records shows you are due for the following:    Colonoscopy    Please call the following office to schedule your appointment:    [facility]    We look forward to hearing from you      Sincerely,    Dr Michael Liao

## 2020-09-14 ENCOUNTER — TELEPHONE (OUTPATIENT)
Dept: INTERNAL MEDICINE CLINIC | Facility: CLINIC | Age: 65
End: 2020-09-14

## 2020-09-14 DIAGNOSIS — I10 ESSENTIAL HYPERTENSION: ICD-10-CM

## 2020-09-14 RX ORDER — LISINOPRIL AND HYDROCHLOROTHIAZIDE 20; 12.5 MG/1; MG/1
TABLET ORAL
Qty: 180 TABLET | Refills: 1 | Status: SHIPPED | OUTPATIENT
Start: 2020-09-14

## 2020-09-17 NOTE — TELEPHONE ENCOUNTER
Call back # [phone]    lisinopril-hydrochlorothiazide (PRINZIDE,ZESTORETIC) 20-12 5 MG per tablet    [facility] # [phone]

## 2020-09-21 DIAGNOSIS — N40.1 BENIGN PROSTATIC HYPERPLASIA WITH URINARY FREQUENCY: ICD-10-CM

## 2020-09-21 DIAGNOSIS — R35.0 BENIGN PROSTATIC HYPERPLASIA WITH URINARY FREQUENCY: ICD-10-CM

## 2020-09-21 RX ORDER — TAMSULOSIN HYDROCHLORIDE 0.4 MG/1
0.4 CAPSULE ORAL
Qty: 90 CAPSULE | Refills: 3 | Status: SHIPPED | OUTPATIENT
Start: 2020-09-21

## 2020-09-21 NOTE — TELEPHONE ENCOUNTER
An Auto-fax Refill Request for Tamsulosin 0 4mg was received from [facility]/pharmacy #[id number]      The patient was last evaluated via telemedicine or video visit on 6/15/20 with Estrada Joy PA-C out of the [facility] location; continuation of the medication was authorized at that time    Request for same, 90 day supply with 3 refills was queued and forwarded to the Advanced Practitioner covering the [facility] location for approval

## 2020-12-11 ENCOUNTER — TELEPHONE (OUTPATIENT)
Dept: UROLOGY | Facility: CLINIC | Age: 65
End: 2020-12-11

## 2020-12-14 ENCOUNTER — TELEPHONE (OUTPATIENT)
Dept: UROLOGY | Facility: CLINIC | Age: 65
End: 2020-12-14

## 2021-02-15 ENCOUNTER — TELEPHONE (OUTPATIENT)
Dept: UROLOGY | Facility: CLINIC | Age: 66
End: 2021-02-15

## 2021-02-15 NOTE — TELEPHONE ENCOUNTER
Returned mailed in [address] office today  Lab slips that were mailed to pt in Dec 2020    Returned as   NO SUCH NUMBER  Altho mailed to address on pt account and address on pts drivers license

## 2021-02-17 DIAGNOSIS — N40.1 BENIGN PROSTATIC HYPERPLASIA WITH URINARY FREQUENCY: ICD-10-CM

## 2021-02-17 DIAGNOSIS — R35.0 BENIGN PROSTATIC HYPERPLASIA WITH URINARY FREQUENCY: ICD-10-CM

## 2021-02-18 RX ORDER — SILDENAFIL CITRATE 20 MG/1
TABLET ORAL
Qty: 30 TABLET | Refills: 3 | Status: SHIPPED | OUTPATIENT
Start: 2021-02-18

## 2021-02-18 NOTE — TELEPHONE ENCOUNTER
The patient was last evaluated via telemedicine or video visit on 6/15/20 with Seun Starkey PA-C out of the [facility] location; continuation of the medication was authorized at that time    Request for same, 30 count supply with 3 refills was queued and forwarded to the Advanced Practitioner covering the [facility] location for approval

## 2021-03-05 ENCOUNTER — TELEPHONE (OUTPATIENT)
Dept: INTERNAL MEDICINE CLINIC | Facility: CLINIC | Age: 66
End: 2021-03-05

## 2021-03-05 DIAGNOSIS — I10 ESSENTIAL HYPERTENSION: Primary | ICD-10-CM

## 2021-03-05 RX ORDER — LISINOPRIL AND HYDROCHLOROTHIAZIDE 20; 12.5 MG/1; MG/1
TABLET ORAL
Qty: 180 TABLET | Refills: 3 | Status: SHIPPED | OUTPATIENT
Start: 2021-03-05

## 2021-03-24 DIAGNOSIS — I10 ESSENTIAL HYPERTENSION: ICD-10-CM

## 2021-03-24 RX ORDER — AMLODIPINE BESYLATE 10 MG/1
TABLET ORAL
Qty: 90 TABLET | Refills: 1 | Status: SHIPPED | OUTPATIENT
Start: 2021-03-24 | End: 2021-09-17

## 2021-03-24 NOTE — TELEPHONE ENCOUNTER
Pt called regarding refill, [facility] states they did not receive the prescription  Please re-send to [facility] in [facility]  Also has a refill in for amlodipine 10mg, see other task

## 2021-03-25 ENCOUNTER — TELEPHONE (OUTPATIENT)
Dept: INTERNAL MEDICINE CLINIC | Facility: CLINIC | Age: 66
End: 2021-03-25

## 2021-08-20 ENCOUNTER — TELEPHONE (OUTPATIENT)
Dept: GASTROENTEROLOGY | Facility: CLINIC | Age: 66
End: 2021-08-20

## 2021-08-20 NOTE — TELEPHONE ENCOUNTER
[facility] [place] requesting patients last copy of colonoscopy   Faxed info to

## 2021-09-17 DIAGNOSIS — I10 ESSENTIAL HYPERTENSION: ICD-10-CM

## 2021-09-17 RX ORDER — AMLODIPINE BESYLATE 10 MG/1
TABLET ORAL
Qty: 90 TABLET | Refills: 1 | Status: SHIPPED | OUTPATIENT
Start: 2021-09-17 | End: 2022-03-22

## 2022-10-27 NOTE — TELEPHONE ENCOUNTER
Patient called in regard to urinalysis results    He can be reached at [phone]  Thank you normal...